# Patient Record
Sex: FEMALE | Race: BLACK OR AFRICAN AMERICAN | Employment: PART TIME | URBAN - METROPOLITAN AREA
[De-identification: names, ages, dates, MRNs, and addresses within clinical notes are randomized per-mention and may not be internally consistent; named-entity substitution may affect disease eponyms.]

---

## 2021-11-15 ENCOUNTER — HOSPITAL ENCOUNTER (INPATIENT)
Age: 21
LOS: 5 days | Discharge: HOME | DRG: 872 | End: 2021-11-20
Attending: INTERNAL MEDICINE | Admitting: INTERNAL MEDICINE
Payer: COMMERCIAL

## 2021-11-15 ENCOUNTER — APPOINTMENT (OUTPATIENT)
Dept: CT IMAGING | Age: 21
DRG: 872 | End: 2021-11-15
Payer: COMMERCIAL

## 2021-11-15 DIAGNOSIS — N15.1 ABSCESS OF LEFT KIDNEY: ICD-10-CM

## 2021-11-15 DIAGNOSIS — N10 ACUTE PYELONEPHRITIS: Primary | ICD-10-CM

## 2021-11-15 PROBLEM — A41.9 SEPSIS (HCC): Status: ACTIVE | Noted: 2021-11-15

## 2021-11-15 LAB
A/G RATIO: 0.8 (ref 1.1–2.2)
ALBUMIN SERPL-MCNC: 3.1 G/DL (ref 3.4–5)
ALP BLD-CCNC: 116 U/L (ref 40–129)
ALT SERPL-CCNC: 15 U/L (ref 10–40)
ANION GAP SERPL CALCULATED.3IONS-SCNC: 9 MMOL/L (ref 3–16)
AST SERPL-CCNC: 16 U/L (ref 15–37)
BACTERIA: ABNORMAL /HPF
BANDED NEUTROPHILS RELATIVE PERCENT: 3 % (ref 0–7)
BASOPHILS ABSOLUTE: 0 K/UL (ref 0–0.2)
BASOPHILS RELATIVE PERCENT: 0 %
BILIRUB SERPL-MCNC: 0.6 MG/DL (ref 0–1)
BILIRUBIN URINE: NEGATIVE
BLOOD, URINE: ABNORMAL
BUN BLDV-MCNC: 12 MG/DL (ref 7–20)
CALCIUM SERPL-MCNC: 8.8 MG/DL (ref 8.3–10.6)
CHLORIDE BLD-SCNC: 97 MMOL/L (ref 99–110)
CLARITY: ABNORMAL
CO2: 24 MMOL/L (ref 21–32)
COLOR: ABNORMAL
COMMENT UA: ABNORMAL
CREAT SERPL-MCNC: 0.9 MG/DL (ref 0.6–1.1)
EOSINOPHILS ABSOLUTE: 0 K/UL (ref 0–0.6)
EOSINOPHILS RELATIVE PERCENT: 0 %
EPITHELIAL CELLS, UA: 8 /HPF (ref 0–5)
GFR AFRICAN AMERICAN: >60
GFR NON-AFRICAN AMERICAN: >60
GLUCOSE BLD-MCNC: 120 MG/DL (ref 70–99)
GLUCOSE URINE: NEGATIVE MG/DL
HCG QUALITATIVE: NEGATIVE
HCT VFR BLD CALC: 37.3 % (ref 36–48)
HEMOGLOBIN: 12.2 G/DL (ref 12–16)
KETONES, URINE: ABNORMAL MG/DL
LACTIC ACID: 1.2 MMOL/L (ref 0.4–2)
LEUKOCYTE ESTERASE, URINE: ABNORMAL
LIPASE: 27 U/L (ref 13–60)
LYMPHOCYTES ABSOLUTE: 1 K/UL (ref 1–5.1)
LYMPHOCYTES RELATIVE PERCENT: 4 %
MCH RBC QN AUTO: 29.8 PG (ref 26–34)
MCHC RBC AUTO-ENTMCNC: 32.7 G/DL (ref 31–36)
MCV RBC AUTO: 91 FL (ref 80–100)
MICROSCOPIC EXAMINATION: YES
MONOCYTES ABSOLUTE: 1.7 K/UL (ref 0–1.3)
MONOCYTES RELATIVE PERCENT: 7 %
NEUTROPHILS ABSOLUTE: 21.2 K/UL (ref 1.7–7.7)
NEUTROPHILS RELATIVE PERCENT: 86 %
NITRITE, URINE: POSITIVE
PDW BLD-RTO: 13.2 % (ref 12.4–15.4)
PH UA: 6.5 (ref 5–8)
PLATELET # BLD: 309 K/UL (ref 135–450)
PMV BLD AUTO: 7.3 FL (ref 5–10.5)
POTASSIUM SERPL-SCNC: 3.7 MMOL/L (ref 3.5–5.1)
PROTEIN UA: 100 MG/DL
RBC # BLD: 4.1 M/UL (ref 4–5.2)
RBC # BLD: NORMAL 10*6/UL
RBC UA: 16 /HPF (ref 0–4)
SMUDGE CELLS: PRESENT
SODIUM BLD-SCNC: 130 MMOL/L (ref 136–145)
SPECIFIC GRAVITY UA: 1.02 (ref 1–1.03)
TOTAL PROTEIN: 7.2 G/DL (ref 6.4–8.2)
URINE REFLEX TO CULTURE: YES
URINE TYPE: ABNORMAL
UROBILINOGEN, URINE: 4 E.U./DL
WBC # BLD: 23.8 K/UL (ref 4–11)
WBC UA: 847 /HPF (ref 0–5)

## 2021-11-15 PROCEDURE — 96375 TX/PRO/DX INJ NEW DRUG ADDON: CPT

## 2021-11-15 PROCEDURE — 87186 SC STD MICRODIL/AGAR DIL: CPT

## 2021-11-15 PROCEDURE — 87088 URINE BACTERIA CULTURE: CPT

## 2021-11-15 PROCEDURE — 99283 EMERGENCY DEPT VISIT LOW MDM: CPT

## 2021-11-15 PROCEDURE — 96374 THER/PROPH/DIAG INJ IV PUSH: CPT

## 2021-11-15 PROCEDURE — 74177 CT ABD & PELVIS W/CONTRAST: CPT

## 2021-11-15 PROCEDURE — 36415 COLL VENOUS BLD VENIPUNCTURE: CPT

## 2021-11-15 PROCEDURE — 6360000002 HC RX W HCPCS: Performed by: INTERNAL MEDICINE

## 2021-11-15 PROCEDURE — 85025 COMPLETE CBC W/AUTO DIFF WBC: CPT

## 2021-11-15 PROCEDURE — 84703 CHORIONIC GONADOTROPIN ASSAY: CPT

## 2021-11-15 PROCEDURE — 80053 COMPREHEN METABOLIC PANEL: CPT

## 2021-11-15 PROCEDURE — 2580000003 HC RX 258: Performed by: PHYSICIAN ASSISTANT

## 2021-11-15 PROCEDURE — 83605 ASSAY OF LACTIC ACID: CPT

## 2021-11-15 PROCEDURE — 6370000000 HC RX 637 (ALT 250 FOR IP): Performed by: INTERNAL MEDICINE

## 2021-11-15 PROCEDURE — 6360000002 HC RX W HCPCS: Performed by: PHYSICIAN ASSISTANT

## 2021-11-15 PROCEDURE — 2060000000 HC ICU INTERMEDIATE R&B

## 2021-11-15 PROCEDURE — 81001 URINALYSIS AUTO W/SCOPE: CPT

## 2021-11-15 PROCEDURE — 87040 BLOOD CULTURE FOR BACTERIA: CPT

## 2021-11-15 PROCEDURE — 83690 ASSAY OF LIPASE: CPT

## 2021-11-15 PROCEDURE — 87086 URINE CULTURE/COLONY COUNT: CPT

## 2021-11-15 PROCEDURE — 2580000003 HC RX 258: Performed by: INTERNAL MEDICINE

## 2021-11-15 PROCEDURE — 6360000004 HC RX CONTRAST MEDICATION: Performed by: PHYSICIAN ASSISTANT

## 2021-11-15 PROCEDURE — 6370000000 HC RX 637 (ALT 250 FOR IP): Performed by: PHYSICIAN ASSISTANT

## 2021-11-15 RX ORDER — ACETAMINOPHEN 500 MG
500 TABLET ORAL ONCE
Status: COMPLETED | OUTPATIENT
Start: 2021-11-15 | End: 2021-11-15

## 2021-11-15 RX ORDER — ONDANSETRON 2 MG/ML
4 INJECTION INTRAMUSCULAR; INTRAVENOUS EVERY 6 HOURS PRN
Status: DISCONTINUED | OUTPATIENT
Start: 2021-11-15 | End: 2021-11-20 | Stop reason: HOSPADM

## 2021-11-15 RX ORDER — SODIUM CHLORIDE 0.9 % (FLUSH) 0.9 %
5-40 SYRINGE (ML) INJECTION PRN
Status: DISCONTINUED | OUTPATIENT
Start: 2021-11-15 | End: 2021-11-20 | Stop reason: HOSPADM

## 2021-11-15 RX ORDER — ACETAMINOPHEN 325 MG/1
650 TABLET ORAL EVERY 6 HOURS PRN
Status: DISCONTINUED | OUTPATIENT
Start: 2021-11-15 | End: 2021-11-20 | Stop reason: HOSPADM

## 2021-11-15 RX ORDER — MORPHINE SULFATE 2 MG/ML
2 INJECTION, SOLUTION INTRAMUSCULAR; INTRAVENOUS
Status: DISCONTINUED | OUTPATIENT
Start: 2021-11-15 | End: 2021-11-20 | Stop reason: HOSPADM

## 2021-11-15 RX ORDER — SODIUM CHLORIDE, SODIUM LACTATE, POTASSIUM CHLORIDE, CALCIUM CHLORIDE 600; 310; 30; 20 MG/100ML; MG/100ML; MG/100ML; MG/100ML
INJECTION, SOLUTION INTRAVENOUS CONTINUOUS
Status: DISCONTINUED | OUTPATIENT
Start: 2021-11-15 | End: 2021-11-20 | Stop reason: HOSPADM

## 2021-11-15 RX ORDER — 0.9 % SODIUM CHLORIDE 0.9 %
1000 INTRAVENOUS SOLUTION INTRAVENOUS ONCE
Status: COMPLETED | OUTPATIENT
Start: 2021-11-15 | End: 2021-11-15

## 2021-11-15 RX ORDER — ONDANSETRON 2 MG/ML
4 INJECTION INTRAMUSCULAR; INTRAVENOUS ONCE
Status: COMPLETED | OUTPATIENT
Start: 2021-11-15 | End: 2021-11-15

## 2021-11-15 RX ORDER — SODIUM CHLORIDE 9 MG/ML
25 INJECTION, SOLUTION INTRAVENOUS PRN
Status: DISCONTINUED | OUTPATIENT
Start: 2021-11-15 | End: 2021-11-20 | Stop reason: HOSPADM

## 2021-11-15 RX ORDER — FENTANYL CITRATE 50 UG/ML
25 INJECTION, SOLUTION INTRAMUSCULAR; INTRAVENOUS ONCE
Status: COMPLETED | OUTPATIENT
Start: 2021-11-15 | End: 2021-11-15

## 2021-11-15 RX ORDER — LAMOTRIGINE 25 MG/1
25 TABLET ORAL 2 TIMES DAILY
COMMUNITY

## 2021-11-15 RX ORDER — ONDANSETRON 4 MG/1
4 TABLET, ORALLY DISINTEGRATING ORAL EVERY 8 HOURS PRN
Status: DISCONTINUED | OUTPATIENT
Start: 2021-11-15 | End: 2021-11-20 | Stop reason: HOSPADM

## 2021-11-15 RX ORDER — ACETAMINOPHEN 650 MG/1
650 SUPPOSITORY RECTAL EVERY 6 HOURS PRN
Status: DISCONTINUED | OUTPATIENT
Start: 2021-11-15 | End: 2021-11-20 | Stop reason: HOSPADM

## 2021-11-15 RX ORDER — SODIUM CHLORIDE 0.9 % (FLUSH) 0.9 %
5-40 SYRINGE (ML) INJECTION EVERY 12 HOURS SCHEDULED
Status: DISCONTINUED | OUTPATIENT
Start: 2021-11-15 | End: 2021-11-20 | Stop reason: HOSPADM

## 2021-11-15 RX ORDER — POLYETHYLENE GLYCOL 3350 17 G/17G
17 POWDER, FOR SOLUTION ORAL DAILY PRN
Status: DISCONTINUED | OUTPATIENT
Start: 2021-11-15 | End: 2021-11-20 | Stop reason: HOSPADM

## 2021-11-15 RX ADMIN — SODIUM CHLORIDE, POTASSIUM CHLORIDE, SODIUM LACTATE AND CALCIUM CHLORIDE: 600; 310; 30; 20 INJECTION, SOLUTION INTRAVENOUS at 16:20

## 2021-11-15 RX ADMIN — SODIUM CHLORIDE, PRESERVATIVE FREE 10 ML: 5 INJECTION INTRAVENOUS at 22:27

## 2021-11-15 RX ADMIN — ACETAMINOPHEN 650 MG: 325 TABLET ORAL at 20:44

## 2021-11-15 RX ADMIN — SODIUM CHLORIDE 1000 ML: 9 INJECTION, SOLUTION INTRAVENOUS at 13:09

## 2021-11-15 RX ADMIN — SODIUM CHLORIDE 25 ML: 9 INJECTION, SOLUTION INTRAVENOUS at 20:39

## 2021-11-15 RX ADMIN — PIPERACILLIN AND TAZOBACTAM 3375 MG: 3; .375 INJECTION, POWDER, LYOPHILIZED, FOR SOLUTION INTRAVENOUS at 20:40

## 2021-11-15 RX ADMIN — ONDANSETRON 4 MG: 2 INJECTION INTRAMUSCULAR; INTRAVENOUS at 19:20

## 2021-11-15 RX ADMIN — PIPERACILLIN AND TAZOBACTAM 3375 MG: 3; .375 INJECTION, POWDER, LYOPHILIZED, FOR SOLUTION INTRAVENOUS at 14:41

## 2021-11-15 RX ADMIN — FENTANYL CITRATE 25 MCG: 50 INJECTION, SOLUTION INTRAMUSCULAR; INTRAVENOUS at 13:09

## 2021-11-15 RX ADMIN — MORPHINE SULFATE 2 MG: 2 INJECTION, SOLUTION INTRAMUSCULAR; INTRAVENOUS at 16:22

## 2021-11-15 RX ADMIN — MORPHINE SULFATE 2 MG: 2 INJECTION, SOLUTION INTRAMUSCULAR; INTRAVENOUS at 19:20

## 2021-11-15 RX ADMIN — ENOXAPARIN SODIUM 40 MG: 100 INJECTION SUBCUTANEOUS at 17:37

## 2021-11-15 RX ADMIN — ACETAMINOPHEN 500 MG: 500 TABLET ORAL at 11:00

## 2021-11-15 RX ADMIN — MORPHINE SULFATE 2 MG: 2 INJECTION, SOLUTION INTRAMUSCULAR; INTRAVENOUS at 22:27

## 2021-11-15 RX ADMIN — SODIUM CHLORIDE 1000 ML: 9 INJECTION, SOLUTION INTRAVENOUS at 11:46

## 2021-11-15 RX ADMIN — SODIUM CHLORIDE, PRESERVATIVE FREE 10 ML: 5 INJECTION INTRAVENOUS at 20:34

## 2021-11-15 RX ADMIN — IOPAMIDOL 75 ML: 755 INJECTION, SOLUTION INTRAVENOUS at 12:04

## 2021-11-15 RX ADMIN — ONDANSETRON 4 MG: 2 INJECTION INTRAMUSCULAR; INTRAVENOUS at 11:46

## 2021-11-15 ASSESSMENT — PAIN DESCRIPTION - ORIENTATION
ORIENTATION: LEFT
ORIENTATION: LOWER
ORIENTATION: LEFT

## 2021-11-15 ASSESSMENT — PAIN SCALES - GENERAL
PAINLEVEL_OUTOF10: 8
PAINLEVEL_OUTOF10: 6
PAINLEVEL_OUTOF10: 8
PAINLEVEL_OUTOF10: 4
PAINLEVEL_OUTOF10: 6
PAINLEVEL_OUTOF10: 8
PAINLEVEL_OUTOF10: 7

## 2021-11-15 ASSESSMENT — ENCOUNTER SYMPTOMS
BLOOD IN STOOL: 0
NAUSEA: 1
COUGH: 0
SHORTNESS OF BREATH: 0
ABDOMINAL PAIN: 1
COLOR CHANGE: 0
DIARRHEA: 0
VOMITING: 1
BACK PAIN: 1
ANAL BLEEDING: 0
CONSTIPATION: 1
STRIDOR: 0
WHEEZING: 0
RECTAL PAIN: 0
ABDOMINAL DISTENTION: 0

## 2021-11-15 ASSESSMENT — PAIN DESCRIPTION - PAIN TYPE
TYPE: ACUTE PAIN

## 2021-11-15 ASSESSMENT — PAIN DESCRIPTION - LOCATION
LOCATION: ABDOMEN;BACK

## 2021-11-15 NOTE — PROGRESS NOTES
Pt seen in  ED, admission completed. Pt is alert and oriented x 4. Pt lives at home with her parents, and is being admitted for sepsis. Plan of care updated, all questions answered.

## 2021-11-15 NOTE — ED NOTES
Pharmacy Medication History Note      List of current medications patient is taking is complete. Source of information: patient and fill hx     Changes made to medication list:  Medications flagged for removal (include reason, ex. noncompliance):  none    Medications removed (include reason, ex. therapy complete or physician discontinued):  none      Medications added/doses adjusted:  none    Other notes (ex. Recent course of antibiotics, Coumadin dosing):  Denies use of other OTC or herbal medications. Last dose times updated. Hemant Benz, EarlineD  Pharmacy Resident   [id number]    No current facility-administered medications on file prior to encounter.      Current Outpatient Medications on File Prior to Encounter   Medication Sig Dispense Refill    cariprazine hcl (VRAYLAR) 1.5 MG capsule Take 1.5 mg by mouth daily       lamoTRIgine (LAMICTAL) 25 MG tablet Take 25 mg by mouth 2 times daily

## 2021-11-15 NOTE — ED PROVIDER NOTES
[facility]        Pt Name: Citlalli Rondon  MRN: [id number]  [place] 2000  Date of evaluation: 11/15/2021  Provider: Jamilah Flores PA-C  PCP: No primary care provider on file. Note Started: 11:20 AM EST       PRASAD. I have evaluated this patient. My supervising physician was available for consultation. CHIEF COMPLAINT       Chief Complaint   Patient presents with    Abdominal Pain     Pt to ER from home for complaint of generalized abdominal pain, low back pain, N/V, and fever starting 2 days ago. Pt reports poor appetite, as well as constipation. She also reports frequency of urine and cloudy urine. +dizziness.  Emesis    Fever    Back Pain       HISTORY OF PRESENT ILLNESS   (Location, Timing/Onset, Context/Setting, Quality, Duration, Modifying Factors, Severity, Associated Signs and Symptoms)  Note limiting factors. Chief Complaint: Urinary frequency, urgency and cloudy urine for 3 days and generalized abdominal pain for 2 days    Citlalli Rondon is a 24 y.o. female who presents to the emergency department complaining of urinary frequency, urgency and cloudy urine for 3 days. She has had intermittent fever and chills. She reports generalized abdominal pain for 2 days primarily on the left side with radiation to the low back. She associates this with nausea, vomiting, constipation, lightheadedness and poor appetite. She is lying in bed comfortably despite rating pain to be 8 out of 10 on pain scale. She took Aleve earlier today. Has not taken any Tylenol. Nursing Notes were all reviewed and agreed with or any disagreements were addressed in the HPI. REVIEW OF SYSTEMS    (2-9 systems for level 4, 10 or more for level 5)     Review of Systems   Constitutional: Positive for appetite change, chills and fever. HENT: Negative. Eyes: Negative for visual disturbance.    Respiratory: Negative for cough, shortness of breath, wheezing and stridor. Cardiovascular: Negative for chest pain, palpitations and leg swelling. Gastrointestinal: Positive for abdominal pain, constipation, nausea and vomiting. Negative for abdominal distention, anal bleeding, blood in stool, diarrhea and rectal pain. Endocrine: Negative. Genitourinary: Positive for frequency and urgency. Negative for difficulty urinating, dysuria, flank pain, hematuria, pelvic pain, vaginal bleeding, vaginal discharge and vaginal pain. Musculoskeletal: Positive for back pain. Negative for neck pain and neck stiffness. Skin: Negative for color change, pallor, rash and wound. Neurological: Positive for light-headedness. Negative for syncope, facial asymmetry, numbness and headaches. Psychiatric/Behavioral: Negative for confusion. All other systems reviewed and are negative. Positives and Pertinent negatives as per HPI. Except as noted above in the ROS, all other systems were reviewed and negative. PAST MEDICAL HISTORY   History reviewed. No pertinent past medical history. SURGICAL HISTORY   History reviewed. No pertinent surgical history. CURRENTMEDICATIONS       Previous Medications    CARIPRAZINE HCL (VRAYLAR PO)    Take by mouth daily    LAMOTRIGINE (LAMICTAL PO)    Take by mouth daily         ALLERGIES     Patient has no known allergies. FAMILYHISTORY     History reviewed. No pertinent family history. SOCIAL HISTORY       Social History     Tobacco Use    Smoking status: Never Smoker    Smokeless tobacco: Never Used   Substance Use Topics    Alcohol use: Never    Drug use: Never       SCREENINGS             PHYSICAL EXAM    (up to 7 for level 4, 8 or more for level 5)     ED Triage Vitals [11/15/21 1045]   BP Temp Temp Source Pulse Resp SpO2 Height Weight   98/63 100.9 °F (38.3 °C) Oral 110 14 100 % 5' 8\" (1.727 m) 131 lb (59.4 kg)       Physical Exam  Vitals and nursing note reviewed. Constitutional:       Appearance: She is well-developed. She is not toxic-appearing or diaphoretic. HENT:      Head: Normocephalic and atraumatic. Right Ear: External ear normal.      Left Ear: External ear normal.      Nose: Nose normal.      Mouth/Throat:      Mouth: Mucous membranes are moist.      Pharynx: Oropharynx is clear. Eyes:      General: No scleral icterus. Right eye: No discharge. Left eye: No discharge. Extraocular Movements: Extraocular movements intact. Conjunctiva/sclera: Conjunctivae normal.      Pupils: Pupils are equal, round, and reactive to light. Cardiovascular:      Rate and Rhythm: Tachycardia present. Pulmonary:      Effort: Pulmonary effort is normal.      Breath sounds: Normal breath sounds. Abdominal:      General: Bowel sounds are normal. There is no abdominal bruit. Palpations: Abdomen is soft. There is no pulsatile mass. Tenderness: There is generalized abdominal tenderness. There is no right CVA tenderness, left CVA tenderness, guarding or rebound. Negative signs include Chaney's sign, Rovsing's sign, McBurney's sign, psoas sign and obturator sign. Musculoskeletal:         General: Normal range of motion. Cervical back: Normal range of motion. Skin:     General: Skin is warm and dry. Capillary Refill: Capillary refill takes less than 2 seconds. Coloration: Skin is not jaundiced or pale. Findings: No bruising, erythema, lesion or rash. Neurological:      Mental Status: She is alert and oriented to person, place, and time. Mental status is at baseline.    Psychiatric:         Mood and Affect: Mood normal.         Behavior: Behavior normal.         DIAGNOSTIC RESULTS   LABS:    Labs Reviewed   CBC WITH AUTO DIFFERENTIAL - Abnormal; Notable for the following components:       Result Value    WBC 23.8 (*)     Neutrophils Absolute 21.2 (*)     Monocytes Absolute 1.7 (*)     Smudge Cells Present (*)     All other components within normal limits    Narrative:     Performed at:  [facility] [address] [employer]  [place], [facility] [employer]   Phone [phone]   COMPREHENSIVE METABOLIC PANEL - Abnormal; Notable for the following components:    Sodium 130 (*)     Chloride 97 (*)     Glucose 120 (*)     Albumin 3.1 (*)     Albumin/Globulin Ratio 0.8 (*)     All other components within normal limits    Narrative:     Performed at:  [facility] [address] [employer]  [place], [facility] [employer]   Phone [phone]   URINE RT REFLEX TO CULTURE - Abnormal; Notable for the following components:    Clarity, UA TURBID (*)     Ketones, Urine TRACE (*)     Blood, Urine MODERATE (*)     Protein,  (*)     Urobilinogen, Urine 4.0 (*)     Nitrite, Urine POSITIVE (*)     Leukocyte Esterase, Urine LARGE (*)     All other components within normal limits    Narrative:     Performed at:  [facility] [address]  [place], [address] [employer]   Phone [phone]   MICROSCOPIC URINALYSIS - Abnormal; Notable for the following components:    Bacteria, UA 4+ (*)     WBC,  (*)     RBC, UA 16 (*)     Epithelial Cells, UA 8 (*)     All other components within normal limits    Narrative:     Performed at:  [facility] [address]  [place], [facility] [employer]   Phone [phone]   CULTURE, URINE   CULTURE, BLOOD 2   CULTURE, BLOOD 1   LACTIC ACID, PLASMA    Narrative:     Performed at:  [facility] [address]  [place], [facility] [employer]   Phone [phone]   LIPASE    Narrative:     Performed at:  [facility] [address],  [place], [facility] [employer]   Phone [phone]   HCG, SERUM, QUALITATIVE    Narrative:     Performed at:  [facility] [address] [employer]  [place], [facility] [employer]   Phone [phone]   LACTIC ACID, PLASMA       When ordered only abnormal lab results are displayed. All other labs were within normal range or not returned as of this dictation. EKG: When ordered, EKG's are interpreted by the Emergency Department Physician in the absence of a cardiologist.  Please see their note for interpretation of EKG. RADIOLOGY:   Non-plain film images such as CT, Ultrasound and MRI are read by the radiologist. Plain radiographic images are visualized and preliminarily interpreted by the ED Provider with the below findings:        Interpretation per the Radiologist below, if available at the time of this note:    CT ABDOMEN PELVIS W IV CONTRAST Additional Contrast? None   Preliminary Result   1. Findings are most consistent with asymmetric left pyelonephritis with a   possible developing cortical abscess within the left kidney upper pole. 2. Findings suggestive of cystitis. PROCEDURES   Unless otherwise noted below, none     Procedures    CRITICAL CARE TIME   N/A    CONSULTS:  IP CONSULT TO UROLOGY  IP CONSULT TO HOSPITALIST  I discussed the case with Dr. Ryanne Duncan, urologist, at [phone]. Agrees with plan for IV cefepime. Would like for hospitalist to admit.     EMERGENCY DEPARTMENT COURSE and DIFFERENTIAL DIAGNOSIS/MDM:   Vitals:    Vitals:    11/15/21 1045   BP: 98/63   Pulse: 110   Resp: 14   Temp: 100.9 °F (38.3 °C)   TempSrc: Oral   SpO2: 100%   Weight: 131 lb (59.4 kg)   Height: 5' 8\" (1.727 m)       Patient was given the following medications:  Medications   0.9 % sodium chloride bolus (has no administration in time range)   cefepime (MAXIPIME) 2000 mg IVPB minibag (has no administration in time range)   fentaNYL (SUBLIMAZE) injection 25 mcg (has no administration in time range)   0.9 % sodium chloride bolus (1,000 mLs IntraVENous New Bag 11/15/21 1146)   ondansetron (ZOFRAN) injection 4 mg (4 mg IntraVENous Given 11/15/21 1146)   acetaminophen (TYLENOL) tablet 500 mg (500 mg Oral Given 11/15/21 1100)   iopamidol (ISOVUE-370) 76 % injection 75 mL (75 mLs IntraVENous Given 11/15/21 9239)         This patient presents to the emergency department complaining of urinary symptoms and abdominal pain with radiation to low back. Urinalysis does reveals evidence of infection and CT scan shows findings concerning for acute pyelonephritis and developing abscess. Therefore I did consult with urology. Agrees with iv fluids and abx. hospitalist to admit    FINAL IMPRESSION      1. Acute pyelonephritis    2. Abscess of left kidney          DISPOSITION/PLAN   DISPOSITION Decision To Admit 11/15/2021 12:34:46 PM      PATIENT REFERRED TO:  No follow-up provider specified.     DISCHARGE MEDICATIONS:  New Prescriptions    No medications on file       DISCONTINUED MEDICATIONS:  Discontinued Medications    No medications on file              (Please note that portions of this note were completed with a voice recognition program.  Efforts were made to edit the dictations but occasionally words are mis-transcribed.)    Nupur Rivas PA-C (electronically signed)           Nupur Rivas PA-C  11/15/21 4016

## 2021-11-15 NOTE — LETTER
[facility] [address] [place] [address]  Tanika [address]  Phone: [phone]             November 20, 2021    Patient: Merline Salvo   YOB: 2000   Date of Visit: 11/15/2021       To Whom It May Concern:    Dino Crawford was seen and treated in our facility  beginning 11/15/2021 until 11/20/2021. She may return to school on 11/23/2021. Sincerely,       Selvin Montero.  COLTON Alvarez         Signature:__________________________________

## 2021-11-15 NOTE — H&P
HOSPITALISTS HISTORY AND PHYSICAL    11/15/2021 1:40 PM    Patient Information:  Sabrina Rivera is a 24 y.o. female [id number]  PCP:  No primary care provider on file. (Tel: None )    Chief complaint:    Chief Complaint   Patient presents with    Abdominal Pain     Pt to ER from home for complaint of generalized abdominal pain, low back pain, N/V, and fever starting 2 days ago. Pt reports poor appetite, as well as constipation. She also reports frequency of urine and cloudy urine. +dizziness.  Emesis    Fever    Back Pain     History of Present Illness:  Axel Cutler is a 24 y.o. female on Friday started have burning when she urinated along with increased urinary frequency C. This progressed to left flank pain 10 out of 10 sharp in nature along with nausea and multiple episodes of vomiting and poor p.o. intake. Patient did have a fever of 102. *       REVIEW OF SYSTEMS:   Constitutional: see above  ENT: Negative for rhinorrhea, epistaxis, hoarseness, sore throat. Respiratory: Negative for shortness of breath,wheezing  Cardiovascular: Negative for chest pain, palpitations   Gastrointestinalsee above  Genitourinary: Negative for polyuria, dysuria   Hematologic/Lymphatic: Negative for bleeding tendency, easy bruising  Musculoskeletal: Negative for myalgias and arthralgias  Neurologic: Negative for confusion,dysarthria. Skin: Negative for itching,rash  Psychiatric: Negative for depression,anxiety, agitation. Endocrine: Negative for polydipsia,polyuria,heat /cold intolerance. Past Medical History:  deniies  Past Surgical History:  denies  Medications:  No current facility-administered medications on file prior to encounter.      Current Outpatient Medications on File Prior to Encounter   Medication Sig Dispense Refill    cariprazine hcl (VRAYLAR) 1.5 MG capsule Take 1.5 mg by mouth daily       lamoTRIgine (LAMICTAL) 25 MG tablet Take 25 mg by mouth 2 times daily          Allergies:  No Known Allergies     Social History:  Patient Lives at home   reports that she has never smoked. She has never used smokeless tobacco. She reports that she does not drink alcohol and does not use drugs. Family History:  htn  Physical Exam:  /69   Pulse 90   Temp 98.6 °F (37 °C) (Oral)   Resp 16   Ht 5' 8\" (1.727 m)   Wt 131 lb (59.4 kg)   SpO2 100%   BMI 19.92 kg/m²     General appearance:  Appears uncomfortable. AAOx3  HEENT: atraumatic, Pupils equal, muscous membranes moist, no masses appreciated  Cardiovascular: tachycardia no murmurs appreciated cap refill < 2sec  Respiratory: CTAB no wheezing  Gastrointestinal: Abdomen soft, non-tender, BS+ CVA tenderness to palpation  EXT: no edema  Neurology: no gross focal deficts  Psychiatry: Appropriate affect. Not agitated  Skin: Warm, dry, no rashes appreciated    Labs:  CBC:   Lab Results   Component Value Date    WBC 23.8 11/15/2021    RBC 4.10 11/15/2021    HGB 12.2 11/15/2021    HCT 37.3 11/15/2021    MCV 91.0 11/15/2021    MCH 29.8 11/15/2021    MCHC 32.7 11/15/2021    RDW 13.2 11/15/2021     11/15/2021    MPV 7.3 11/15/2021     BMP:    Lab Results   Component Value Date     11/15/2021    K 3.7 11/15/2021    CL 97 11/15/2021    CO2 24 11/15/2021    BUN 12 11/15/2021    CREATININE 0.9 11/15/2021    CALCIUM 8.8 11/15/2021    GFRAA >60 11/15/2021    LABGLOM >60 11/15/2021    GLUCOSE 120 11/15/2021     CT ABDOMEN PELVIS W IV CONTRAST Additional Contrast? None   Preliminary Result   1. Findings are most consistent with asymmetric left pyelonephritis with a   possible developing cortical abscess within the left kidney upper pole. 2. Findings suggestive of cystitis. Recent imaging reviewed    Problem List  Active Problems:    Sepsis ([address]  Resolved Problems:    * No resolved hospital problems.  *        Assessment/Plan: Sepsis secondary to pyelonephritis with possible cortical abscess  - iv zosyn  - bolus 2L  Iv fluids, then 100cc hr  - fu cultures  - urology consult  - iv morphine prn for pain control    Hyponatremia: ivf repeat labs now     DVT prophylaxis lovenox  Code status full code    I spent 32 minutes providing critical care services to this patient        Admit as inpatient I anticipate hospitalization spanning more than two midnights for investigation and treatment of the above medically necessary diagnoses. Please note that some part of this chart was generated using Dragon dictation software. Although every effort was made to ensure the accuracy of this automated transcription, some errors in transcription may have occurred inadvertently. If you may need any clarification, please do not hesitate to contact me through [facility].        Justyn Granados MD    11/15/2021 1:40 PM

## 2021-11-16 LAB
ANION GAP SERPL CALCULATED.3IONS-SCNC: 12 MMOL/L (ref 3–16)
ANISOCYTOSIS: ABNORMAL
BANDED NEUTROPHILS RELATIVE PERCENT: 9 % (ref 0–7)
BASOPHILS ABSOLUTE: 0 K/UL (ref 0–0.2)
BASOPHILS RELATIVE PERCENT: 0 %
BUN BLDV-MCNC: 7 MG/DL (ref 7–20)
CALCIUM SERPL-MCNC: 8 MG/DL (ref 8.3–10.6)
CHLORIDE BLD-SCNC: 104 MMOL/L (ref 99–110)
CO2: 22 MMOL/L (ref 21–32)
CREAT SERPL-MCNC: 0.8 MG/DL (ref 0.6–1.1)
EOSINOPHILS ABSOLUTE: 0 K/UL (ref 0–0.6)
EOSINOPHILS RELATIVE PERCENT: 0 %
GFR AFRICAN AMERICAN: >60
GFR NON-AFRICAN AMERICAN: >60
GLUCOSE BLD-MCNC: 121 MG/DL (ref 70–99)
HCT VFR BLD CALC: 32.5 % (ref 36–48)
HEMOGLOBIN: 10.8 G/DL (ref 12–16)
HYPOCHROMIA: ABNORMAL
LYMPHOCYTES ABSOLUTE: 2.2 K/UL (ref 1–5.1)
LYMPHOCYTES RELATIVE PERCENT: 9 %
MCH RBC QN AUTO: 30.1 PG (ref 26–34)
MCHC RBC AUTO-ENTMCNC: 33.4 G/DL (ref 31–36)
MCV RBC AUTO: 90.3 FL (ref 80–100)
MONOCYTES ABSOLUTE: 1.5 K/UL (ref 0–1.3)
MONOCYTES RELATIVE PERCENT: 6 %
NEUTROPHILS ABSOLUTE: 20.6 K/UL (ref 1.7–7.7)
NEUTROPHILS RELATIVE PERCENT: 76 %
OVALOCYTES: ABNORMAL
PDW BLD-RTO: 13.2 % (ref 12.4–15.4)
PLATELET # BLD: 334 K/UL (ref 135–450)
PLATELET SLIDE REVIEW: ADEQUATE
PMV BLD AUTO: 7 FL (ref 5–10.5)
POLYCHROMASIA: ABNORMAL
POTASSIUM REFLEX MAGNESIUM: 3.8 MMOL/L (ref 3.5–5.1)
RBC # BLD: 3.6 M/UL (ref 4–5.2)
SLIDE REVIEW: ABNORMAL
SODIUM BLD-SCNC: 138 MMOL/L (ref 136–145)
WBC # BLD: 24.2 K/UL (ref 4–11)

## 2021-11-16 PROCEDURE — 2580000003 HC RX 258: Performed by: INTERNAL MEDICINE

## 2021-11-16 PROCEDURE — 85025 COMPLETE CBC W/AUTO DIFF WBC: CPT

## 2021-11-16 PROCEDURE — 6370000000 HC RX 637 (ALT 250 FOR IP): Performed by: INTERNAL MEDICINE

## 2021-11-16 PROCEDURE — 6370000000 HC RX 637 (ALT 250 FOR IP): Performed by: PHYSICIAN ASSISTANT

## 2021-11-16 PROCEDURE — 80048 BASIC METABOLIC PNL TOTAL CA: CPT

## 2021-11-16 PROCEDURE — 6360000002 HC RX W HCPCS: Performed by: INTERNAL MEDICINE

## 2021-11-16 PROCEDURE — 2060000000 HC ICU INTERMEDIATE R&B

## 2021-11-16 PROCEDURE — 36415 COLL VENOUS BLD VENIPUNCTURE: CPT

## 2021-11-16 RX ORDER — PROMETHAZINE HYDROCHLORIDE 25 MG/ML
12.5 INJECTION, SOLUTION INTRAMUSCULAR; INTRAVENOUS EVERY 6 HOURS PRN
Status: DISCONTINUED | OUTPATIENT
Start: 2021-11-16 | End: 2021-11-20 | Stop reason: HOSPADM

## 2021-11-16 RX ORDER — PROMETHAZINE HYDROCHLORIDE 25 MG/ML
12.5 INJECTION, SOLUTION INTRAMUSCULAR; INTRAVENOUS EVERY 6 HOURS PRN
Status: DISCONTINUED | OUTPATIENT
Start: 2021-11-16 | End: 2021-11-16

## 2021-11-16 RX ORDER — LAMOTRIGINE 25 MG/1
25 TABLET ORAL 2 TIMES DAILY
Status: DISCONTINUED | OUTPATIENT
Start: 2021-11-16 | End: 2021-11-20 | Stop reason: HOSPADM

## 2021-11-16 RX ADMIN — PIPERACILLIN AND TAZOBACTAM 3375 MG: 3; .375 INJECTION, POWDER, LYOPHILIZED, FOR SOLUTION INTRAVENOUS at 06:11

## 2021-11-16 RX ADMIN — SODIUM CHLORIDE, POTASSIUM CHLORIDE, SODIUM LACTATE AND CALCIUM CHLORIDE: 600; 310; 30; 20 INJECTION, SOLUTION INTRAVENOUS at 02:39

## 2021-11-16 RX ADMIN — ACETAMINOPHEN 650 MG: 325 TABLET ORAL at 21:29

## 2021-11-16 RX ADMIN — ACETAMINOPHEN 650 MG: 325 TABLET ORAL at 14:45

## 2021-11-16 RX ADMIN — MORPHINE SULFATE 2 MG: 2 INJECTION, SOLUTION INTRAMUSCULAR; INTRAVENOUS at 07:21

## 2021-11-16 RX ADMIN — SODIUM CHLORIDE, PRESERVATIVE FREE 10 ML: 5 INJECTION INTRAVENOUS at 20:06

## 2021-11-16 RX ADMIN — SODIUM CHLORIDE, POTASSIUM CHLORIDE, SODIUM LACTATE AND CALCIUM CHLORIDE: 600; 310; 30; 20 INJECTION, SOLUTION INTRAVENOUS at 12:31

## 2021-11-16 RX ADMIN — SODIUM CHLORIDE 25 ML: 9 INJECTION, SOLUTION INTRAVENOUS at 19:48

## 2021-11-16 RX ADMIN — PIPERACILLIN AND TAZOBACTAM 3375 MG: 3; .375 INJECTION, POWDER, LYOPHILIZED, FOR SOLUTION INTRAVENOUS at 19:49

## 2021-11-16 RX ADMIN — MORPHINE SULFATE 2 MG: 2 INJECTION, SOLUTION INTRAMUSCULAR; INTRAVENOUS at 23:54

## 2021-11-16 RX ADMIN — SODIUM CHLORIDE, PRESERVATIVE FREE 10 ML: 5 INJECTION INTRAVENOUS at 21:42

## 2021-11-16 RX ADMIN — MORPHINE SULFATE 2 MG: 2 INJECTION, SOLUTION INTRAMUSCULAR; INTRAVENOUS at 12:26

## 2021-11-16 RX ADMIN — PROMETHAZINE HYDROCHLORIDE 12.5 MG: 25 INJECTION INTRAMUSCULAR; INTRAVENOUS at 14:41

## 2021-11-16 RX ADMIN — ENOXAPARIN SODIUM 40 MG: 100 INJECTION SUBCUTANEOUS at 09:02

## 2021-11-16 RX ADMIN — PROMETHAZINE HYDROCHLORIDE 12.5 MG: 25 INJECTION INTRAMUSCULAR; INTRAVENOUS at 21:42

## 2021-11-16 RX ADMIN — MORPHINE SULFATE 2 MG: 2 INJECTION, SOLUTION INTRAMUSCULAR; INTRAVENOUS at 01:21

## 2021-11-16 RX ADMIN — SODIUM CHLORIDE, PRESERVATIVE FREE 10 ML: 5 INJECTION INTRAVENOUS at 04:36

## 2021-11-16 RX ADMIN — ACETAMINOPHEN 650 MG: 325 TABLET ORAL at 04:34

## 2021-11-16 RX ADMIN — SODIUM CHLORIDE, PRESERVATIVE FREE 10 ML: 5 INJECTION INTRAVENOUS at 01:21

## 2021-11-16 RX ADMIN — MORPHINE SULFATE 2 MG: 2 INJECTION, SOLUTION INTRAMUSCULAR; INTRAVENOUS at 20:06

## 2021-11-16 RX ADMIN — MORPHINE SULFATE 2 MG: 2 INJECTION, SOLUTION INTRAMUSCULAR; INTRAVENOUS at 04:34

## 2021-11-16 RX ADMIN — MORPHINE SULFATE 2 MG: 2 INJECTION, SOLUTION INTRAMUSCULAR; INTRAVENOUS at 16:55

## 2021-11-16 RX ADMIN — ONDANSETRON 4 MG: 2 INJECTION INTRAMUSCULAR; INTRAVENOUS at 12:14

## 2021-11-16 RX ADMIN — LAMOTRIGINE 25 MG: 25 TABLET ORAL at 21:29

## 2021-11-16 RX ADMIN — SODIUM CHLORIDE, PRESERVATIVE FREE 10 ML: 5 INJECTION INTRAVENOUS at 07:22

## 2021-11-16 RX ADMIN — SODIUM CHLORIDE 25 ML: 9 INJECTION, SOLUTION INTRAVENOUS at 06:10

## 2021-11-16 RX ADMIN — ACETAMINOPHEN 650 MG: 325 TABLET ORAL at 09:06

## 2021-11-16 ASSESSMENT — PAIN DESCRIPTION - LOCATION
LOCATION: ABDOMEN;BACK
LOCATION: ABDOMEN
LOCATION: GENERALIZED
LOCATION: ABDOMEN
LOCATION: ABDOMEN;BACK
LOCATION: ABDOMEN

## 2021-11-16 ASSESSMENT — PAIN DESCRIPTION - ONSET
ONSET: ON-GOING

## 2021-11-16 ASSESSMENT — PAIN SCALES - GENERAL
PAINLEVEL_OUTOF10: 6
PAINLEVEL_OUTOF10: 4
PAINLEVEL_OUTOF10: 6
PAINLEVEL_OUTOF10: 4
PAINLEVEL_OUTOF10: 6
PAINLEVEL_OUTOF10: 3
PAINLEVEL_OUTOF10: 4
PAINLEVEL_OUTOF10: 8
PAINLEVEL_OUTOF10: 4
PAINLEVEL_OUTOF10: 4
PAINLEVEL_OUTOF10: 8
PAINLEVEL_OUTOF10: 5
PAINLEVEL_OUTOF10: 6
PAINLEVEL_OUTOF10: 9
PAINLEVEL_OUTOF10: 3
PAINLEVEL_OUTOF10: 2
PAINLEVEL_OUTOF10: 5

## 2021-11-16 ASSESSMENT — PAIN DESCRIPTION - FREQUENCY
FREQUENCY: CONTINUOUS

## 2021-11-16 ASSESSMENT — PAIN DESCRIPTION - ORIENTATION
ORIENTATION: LEFT
ORIENTATION: LEFT

## 2021-11-16 ASSESSMENT — PAIN DESCRIPTION - DESCRIPTORS
DESCRIPTORS: CONSTANT

## 2021-11-16 ASSESSMENT — PAIN DESCRIPTION - PAIN TYPE
TYPE: ACUTE PAIN

## 2021-11-16 ASSESSMENT — PAIN DESCRIPTION - DIRECTION: RADIATING_TOWARDS: BACK

## 2021-11-16 NOTE — PROGRESS NOTES
[facility] PROGRESS NOTE    11/16/2021 11:14 AM        Name: Manny Carmona . Admitted: 11/15/2021  Primary Care Provider: No primary care provider on file. (Tel: None)        Subjective:    Lying in bed still having left flank pain although improved nausea no chest pain shortness of breath vomiting or diarrhea    Reviewed interval ancillary notes    Current Medications  piperacillin-tazobactam (ZOSYN) 3,375 mg in dextrose 5 % 50 mL IVPB extended infusion (mini-bag), Q8H  morphine (PF) injection 2 mg, Q3H PRN  lactated ringers infusion, Continuous  sodium chloride flush 0.9 % injection 5-40 mL, 2 times per day  sodium chloride flush 0.9 % injection 5-40 mL, PRN  0.9 % sodium chloride infusion, PRN  enoxaparin (LOVENOX) injection 40 mg, Daily  ondansetron (ZOFRAN-ODT) disintegrating tablet 4 mg, Q8H PRN   Or  ondansetron (ZOFRAN) injection 4 mg, Q6H PRN  polyethylene glycol (GLYCOLAX) packet 17 g, Daily PRN  acetaminophen (TYLENOL) tablet 650 mg, Q6H PRN   Or  acetaminophen (TYLENOL) suppository 650 mg, Q6H PRN  influenza quadrivalent split vaccine (FLUZONE;FLUARIX;FLULAVAL;AFLURIA) injection 0.5 mL, Prior to discharge        Objective:  BP 98/61   Pulse 107   Temp 98.5 °F (36.9 °C) (Oral)   Resp 18   Ht 5' 8\" (1.727 m)   Wt 131 lb (59.4 kg)   LMP 10/26/2021 (Exact Date)   SpO2 98%   BMI 19.92 kg/m²     Intake/Output Summary (Last 24 hours) at 11/16/2021 1114  Last data filed at 11/16/2021 0615  Gross per 24 hour   Intake 3468.62 ml   Output --   Net 3468.62 ml      Wt Readings from Last 3 Encounters:   11/15/21 131 lb (59.4 kg)       General appearance:  Appears uncomfortable.  AAOx3  HEENT: atraumatic, Pupils equal, muscous membranes moist, no masses appreciated  Cardiovascular: tachycardia no murmurs appreciated cap refill < 2sec  Respiratory: CTAB no wheezing  Gastrointestinal: Abdomen soft, non-tender, BS+ CVA tenderness to palpation  EXT: no edema  Neurology: no gross focal deficts  Psychiatry: Appropriate affect. Not agitated  Skin: Warm, dry, no rashes appreciated       Labs and Tests:  CBC:   Recent Labs     11/15/21  1113 11/16/21  0515   WBC 23.8* 24.2*   HGB 12.2 10.8*    334     BMP:    Recent Labs     11/15/21  1113 11/16/21  0515   * 138   K 3.7 3.8   CL 97* 104   CO2 24 22   BUN 12 7   CREATININE 0.9 0.8   GLUCOSE 120* 121*     Hepatic:   Recent Labs     11/15/21  1113   AST 16   ALT 15   BILITOT 0.6   ALKPHOS 116     CT ABDOMEN PELVIS W IV CONTRAST Additional Contrast? None   Preliminary Result   1. Findings are most consistent with asymmetric left pyelonephritis with a   possible developing cortical abscess within the left kidney upper pole. 2. Findings suggestive of cystitis. Recent imaging reviewed    Problem List  Active Problems:    Sepsis ([address]  Resolved Problems:    * No resolved hospital problems.  *     Assessment/Plan:   Sepsis secondary to pyelonephritis with possible cortical abscess  - iv zosyn  - continue ivf  - fu cultures  - urology on board  - iv morphine prn for pain control     Hyponatremia:improved  DVT prophylaxis lovenox  Code status full code    Elizabeth Han MD   11/16/2021 11:14 AM

## 2021-11-17 LAB
ANION GAP SERPL CALCULATED.3IONS-SCNC: 11 MMOL/L (ref 3–16)
BANDED NEUTROPHILS RELATIVE PERCENT: 1 % (ref 0–7)
BASOPHILS ABSOLUTE: 0 K/UL (ref 0–0.2)
BASOPHILS RELATIVE PERCENT: 0 %
BUN BLDV-MCNC: 8 MG/DL (ref 7–20)
CALCIUM SERPL-MCNC: 7.9 MG/DL (ref 8.3–10.6)
CHLORIDE BLD-SCNC: 101 MMOL/L (ref 99–110)
CO2: 22 MMOL/L (ref 21–32)
CREAT SERPL-MCNC: 0.7 MG/DL (ref 0.6–1.1)
EOSINOPHILS ABSOLUTE: 0 K/UL (ref 0–0.6)
EOSINOPHILS RELATIVE PERCENT: 0 %
GFR AFRICAN AMERICAN: >60
GFR NON-AFRICAN AMERICAN: >60
GLUCOSE BLD-MCNC: 103 MG/DL (ref 70–99)
HCT VFR BLD CALC: 31.1 % (ref 36–48)
HEMOGLOBIN: 10.5 G/DL (ref 12–16)
LYMPHOCYTES ABSOLUTE: 0.9 K/UL (ref 1–5.1)
LYMPHOCYTES RELATIVE PERCENT: 4 %
MAGNESIUM: 1.8 MG/DL (ref 1.8–2.4)
MCH RBC QN AUTO: 30.2 PG (ref 26–34)
MCHC RBC AUTO-ENTMCNC: 33.7 G/DL (ref 31–36)
MCV RBC AUTO: 89.5 FL (ref 80–100)
MONOCYTES ABSOLUTE: 1.9 K/UL (ref 0–1.3)
MONOCYTES RELATIVE PERCENT: 8 %
NEUTROPHILS ABSOLUTE: 20.8 K/UL (ref 1.7–7.7)
NEUTROPHILS RELATIVE PERCENT: 87 %
ORGANISM: ABNORMAL
PDW BLD-RTO: 13.4 % (ref 12.4–15.4)
PLATELET # BLD: 351 K/UL (ref 135–450)
PLATELET SLIDE REVIEW: ADEQUATE
PMV BLD AUTO: 7 FL (ref 5–10.5)
POTASSIUM REFLEX MAGNESIUM: 3.5 MMOL/L (ref 3.5–5.1)
RBC # BLD: 3.48 M/UL (ref 4–5.2)
RBC # BLD: NORMAL 10*6/UL
SLIDE REVIEW: ABNORMAL
SODIUM BLD-SCNC: 134 MMOL/L (ref 136–145)
URINE CULTURE, ROUTINE: ABNORMAL
WBC # BLD: 23.6 K/UL (ref 4–11)

## 2021-11-17 PROCEDURE — 6360000002 HC RX W HCPCS: Performed by: INTERNAL MEDICINE

## 2021-11-17 PROCEDURE — 80048 BASIC METABOLIC PNL TOTAL CA: CPT

## 2021-11-17 PROCEDURE — 83735 ASSAY OF MAGNESIUM: CPT

## 2021-11-17 PROCEDURE — 6370000000 HC RX 637 (ALT 250 FOR IP): Performed by: INTERNAL MEDICINE

## 2021-11-17 PROCEDURE — 6370000000 HC RX 637 (ALT 250 FOR IP): Performed by: PHYSICIAN ASSISTANT

## 2021-11-17 PROCEDURE — 85025 COMPLETE CBC W/AUTO DIFF WBC: CPT

## 2021-11-17 PROCEDURE — 2060000000 HC ICU INTERMEDIATE R&B

## 2021-11-17 PROCEDURE — 2580000003 HC RX 258: Performed by: INTERNAL MEDICINE

## 2021-11-17 PROCEDURE — 36415 COLL VENOUS BLD VENIPUNCTURE: CPT

## 2021-11-17 RX ORDER — SCOLOPAMINE TRANSDERMAL SYSTEM 1 MG/1
1 PATCH, EXTENDED RELEASE TRANSDERMAL
Status: DISCONTINUED | OUTPATIENT
Start: 2021-11-17 | End: 2021-11-20 | Stop reason: HOSPADM

## 2021-11-17 RX ADMIN — SODIUM CHLORIDE, POTASSIUM CHLORIDE, SODIUM LACTATE AND CALCIUM CHLORIDE: 600; 310; 30; 20 INJECTION, SOLUTION INTRAVENOUS at 03:30

## 2021-11-17 RX ADMIN — ONDANSETRON 4 MG: 2 INJECTION INTRAMUSCULAR; INTRAVENOUS at 03:30

## 2021-11-17 RX ADMIN — ACETAMINOPHEN 650 MG: 325 TABLET ORAL at 03:30

## 2021-11-17 RX ADMIN — PROMETHAZINE HYDROCHLORIDE 12.5 MG: 25 INJECTION INTRAMUSCULAR; INTRAVENOUS at 15:32

## 2021-11-17 RX ADMIN — LAMOTRIGINE 25 MG: 25 TABLET ORAL at 20:55

## 2021-11-17 RX ADMIN — PIPERACILLIN AND TAZOBACTAM 3375 MG: 3; .375 INJECTION, POWDER, LYOPHILIZED, FOR SOLUTION INTRAVENOUS at 10:29

## 2021-11-17 RX ADMIN — LAMOTRIGINE 25 MG: 25 TABLET ORAL at 08:52

## 2021-11-17 RX ADMIN — PIPERACILLIN AND TAZOBACTAM 3375 MG: 3; .375 INJECTION, POWDER, LYOPHILIZED, FOR SOLUTION INTRAVENOUS at 03:33

## 2021-11-17 RX ADMIN — ACETAMINOPHEN 650 MG: 325 TABLET ORAL at 11:40

## 2021-11-17 RX ADMIN — SODIUM CHLORIDE, PRESERVATIVE FREE 10 ML: 5 INJECTION INTRAVENOUS at 20:55

## 2021-11-17 RX ADMIN — ACETAMINOPHEN 650 MG: 650 SUPPOSITORY RECTAL at 19:42

## 2021-11-17 RX ADMIN — CARIPRAZINE 1.5 MG: 1.5 CAPSULE, GELATIN COATED ORAL at 10:31

## 2021-11-17 RX ADMIN — SODIUM CHLORIDE 25 ML: 9 INJECTION, SOLUTION INTRAVENOUS at 10:28

## 2021-11-17 RX ADMIN — ONDANSETRON 4 MG: 2 INJECTION INTRAMUSCULAR; INTRAVENOUS at 11:41

## 2021-11-17 RX ADMIN — PROMETHAZINE HYDROCHLORIDE 12.5 MG: 25 INJECTION INTRAMUSCULAR; INTRAVENOUS at 08:47

## 2021-11-17 RX ADMIN — ENOXAPARIN SODIUM 40 MG: 100 INJECTION SUBCUTANEOUS at 08:52

## 2021-11-17 RX ADMIN — SODIUM CHLORIDE, PRESERVATIVE FREE 10 ML: 5 INJECTION INTRAVENOUS at 08:49

## 2021-11-17 RX ADMIN — SODIUM CHLORIDE, POTASSIUM CHLORIDE, SODIUM LACTATE AND CALCIUM CHLORIDE: 600; 310; 30; 20 INJECTION, SOLUTION INTRAVENOUS at 23:51

## 2021-11-17 RX ADMIN — PIPERACILLIN AND TAZOBACTAM 3375 MG: 3; .375 INJECTION, POWDER, LYOPHILIZED, FOR SOLUTION INTRAVENOUS at 19:43

## 2021-11-17 ASSESSMENT — PAIN SCALES - GENERAL
PAINLEVEL_OUTOF10: 3
PAINLEVEL_OUTOF10: 4
PAINLEVEL_OUTOF10: 7
PAINLEVEL_OUTOF10: 0
PAINLEVEL_OUTOF10: 0
PAINLEVEL_OUTOF10: 3
PAINLEVEL_OUTOF10: 0
PAINLEVEL_OUTOF10: 4

## 2021-11-17 ASSESSMENT — PAIN DESCRIPTION - PAIN TYPE: TYPE: ACUTE PAIN

## 2021-11-17 ASSESSMENT — PAIN DESCRIPTION - DESCRIPTORS: DESCRIPTORS: CONSTANT

## 2021-11-17 ASSESSMENT — PAIN DESCRIPTION - FREQUENCY: FREQUENCY: CONTINUOUS

## 2021-11-17 ASSESSMENT — PAIN DESCRIPTION - ONSET: ONSET: ON-GOING

## 2021-11-17 NOTE — PLAN OF CARE
Problem: Pain:  Goal: Pain level will decrease  Description: Pain level will decrease  Outcome: Ongoing     Pt given morphine IV at 2354 for c/o 4.5/10 ABD pain; see MAR & all flowsheets. Pt reported pain down to 4/10 at 0024 at pain re-evaluation. Will continue to monitor.

## 2021-11-17 NOTE — PLAN OF CARE
Nausea and Vomiting this shift. Scopolamine patch added to treatment plan. Pain controlled with Tylenol. Patient satisfied.        Problem: Pain:  Goal: Pain level will decrease  Description: Pain level will decrease  11/17/2021 1428 by Bassam Mccormick RN  Outcome: Ongoing     Problem: Cardiovascular  Goal: Hemodynamic stability  Outcome: Ongoing

## 2021-11-17 NOTE — PLAN OF CARE
Problem: Cardiovascular  Goal: Hemodynamic stability  Outcome: Ongoing     Problem: Pain:  Goal: Pain level will decrease  Description: Pain level will decrease  Outcome: Ongoing     Vitals:    11/17/21 0327   BP: 124/81   Pulse: 95   Resp: 14   Temp: 98.9 °F (37.2 °C)   SpO2: 97%     Pt awake and c/o pain & nausea. VSS at 0327, afebrile. New IVF bag hung, Zofran, tylenol, & Zosyn given; see MAR & all flowsheets. Will continue to monitor.

## 2021-11-17 NOTE — PROGRESS NOTES
[facility] PROGRESS NOTE    11/17/2021 11:28 AM        Name: Merline Salvo . Admitted: 11/15/2021  Primary Care Provider: No primary care provider on file. (Tel: None)        Subjective:    Is improved but still significant nausea no shortness of breath or diarrhea    Reviewed interval ancillary notes    Current Medications  scopolamine (TRANSDERM-SCOP) transdermal patch 1 patch, Q72H  promethazine (PHENERGAN) injection 12.5 mg, Q6H PRN  cariprazine hcl (VRAYLAR) capsule 1.5 mg, Daily  lamoTRIgine (LAMICTAL) tablet 25 mg, BID  piperacillin-tazobactam (ZOSYN) 3,375 mg in dextrose 5 % 50 mL IVPB extended infusion (mini-bag), Q8H  morphine (PF) injection 2 mg, Q3H PRN  lactated ringers infusion, Continuous  sodium chloride flush 0.9 % injection 5-40 mL, 2 times per day  sodium chloride flush 0.9 % injection 5-40 mL, PRN  0.9 % sodium chloride infusion, PRN  enoxaparin (LOVENOX) injection 40 mg, Daily  ondansetron (ZOFRAN-ODT) disintegrating tablet 4 mg, Q8H PRN   Or  ondansetron (ZOFRAN) injection 4 mg, Q6H PRN  polyethylene glycol (GLYCOLAX) packet 17 g, Daily PRN  acetaminophen (TYLENOL) tablet 650 mg, Q6H PRN   Or  acetaminophen (TYLENOL) suppository 650 mg, Q6H PRN  influenza quadrivalent split vaccine (FLUZONE;FLUARIX;FLULAVAL;AFLURIA) injection 0.5 mL, Prior to discharge        Objective:  /74   Pulse 100   Temp 98.8 °F (37.1 °C) (Oral)   Resp 16   Ht 5' 8\" (1.727 m)   Wt 131 lb (59.4 kg)   LMP 10/26/2021 (Exact Date)   SpO2 97%   BMI 19.92 kg/m²     Intake/Output Summary (Last 24 hours) at 11/17/2021 1128  Last data filed at 11/17/2021 0858  Gross per 24 hour   Intake 2446.16 ml   Output --   Net 2446.16 ml      Wt Readings from Last 3 Encounters:   11/15/21 131 lb (59.4 kg)       General appearance:  Appears uncomfortable.  AAOx3  HEENT: atraumatic, Pupils equal, muscous membranes moist, no masses appreciated  Cardiovascular: tachycardia no murmurs appreciated cap refill < 2sec  Respiratory: CTAB no wheezing  Gastrointestinal: Abdomen soft, non-tender, BS+ CVA tenderness to palpation  EXT: no edema  Neurology: no gross focal deficts  Psychiatry: Appropriate affect. Not agitated  Skin: Warm, dry, no rashes appreciated       Labs and Tests:  CBC:   Recent Labs     11/15/21  1113 11/16/21  0515 11/17/21  0445   WBC 23.8* 24.2* 23.6*   HGB 12.2 10.8* 10.5*    334 351     BMP:    Recent Labs     11/15/21  1113 11/16/21  0515 11/17/21  0445   * 138 134*   K 3.7 3.8 3.5   CL 97* 104 101   CO2 24 22 22   BUN 12 7 8   CREATININE 0.9 0.8 0.7   GLUCOSE 120* 121* 103*     Hepatic:   Recent Labs     11/15/21  1113   AST 16   ALT 15   BILITOT 0.6   ALKPHOS 116     CT ABDOMEN PELVIS W IV CONTRAST Additional Contrast? None   Final Result   1. Findings are most consistent with asymmetric left pyelonephritis with a   possible developing cortical abscess within the left kidney upper pole. 2. Findings suggestive of cystitis. Recent imaging reviewed    Problem List  Active Problems:    Sepsis ([address]  Resolved Problems:    * No resolved hospital problems.  *     Assessment/Plan:   Sepsis secondary to pyelonephritis with possible cortical abscess  - iv zosyn  - continue ivf  - fu cultures blood negative thus far  - reepat ct in am  - urology on board  - iv morphine prn for pain control     Hyponatremia:improved  DVT prophylaxis lovenox  Code status full code    Glenn Wilson MD   11/17/2021 11:28 AM

## 2021-11-17 NOTE — PLAN OF CARE
Problem: Cardiovascular  Goal: Hemodynamic stability  Outcome: Ongoing     Vitals:    11/16/21 2342   BP: 105/69   Pulse: 90   Resp: 14   Temp: 98.8 °F (37.1 °C)   SpO2: 96%     VSS with temp 98.8, afebrile. See all flowsheets. Will continue to monitor.

## 2021-11-17 NOTE — PLAN OF CARE
Problem: Cardiovascular  Goal: Hemodynamic stability  Outcome: Ongoing     Vitals:    11/16/21 1942   BP: 105/69   Pulse: 110   Resp: 14   Temp: 100.1 °F (37.8 °C)   SpO2: 97%     Pt up to bathroom and back to bed with IV pole; gait steady & lines intact. Pt A/O. VSS at shift change at 1942; see all flowsheets. Will continue to monitor.

## 2021-11-17 NOTE — PROGRESS NOTES
Urology Progress Note  [facility]    Provider: YAHIR Adkins CNP  Patient ID:  Admission Date: 11/15/2021 Name: Indira Villanueva  OR Date: 11/15/2021 MRN: [id number]   Patient Location: [id number] : 2000  Attending: Alin Hutchison MD Date of Service: 2021  PCP: No primary care provider on file. Diagnoses:  Pyelonephritis, Possible Renal Abscess    Assessment/Plan:  25 yo female presents with dysuria and frequency progressing to left flank pain associated with nausea, emesis, chills and fever of 102F. UA is concerning for infection. CT a/p demonstrates left pyelonephritis with possible developing 3.3 x 2.2 cm left upper pole cortical abscess. Leukocytosis is 24.2k today, Cr is stable. Continues to have fever and leukocytosis, 23.6k today.        Recommendations:  Continue antibiotics and follow up on urine culture. Follow fever curve and WBC. Continue abx, E.coli UTI, consider Cefepime? CT scan tomorrow or sooner if clinically worsening.      The patient had a chance to ask questions which were answered. she understands the above plan. Subjective:   Indira Villanueva is a 24 y.o. female. She was seen and examined this morning. Today we discussed repeating CT tomorrow and following up on cultures. Objective:   Vitals:  Vitals:    21 0730   BP: 127/74   Pulse: 100   Resp: 16   Temp: 98.8 °F (37.1 °C)   SpO2: 97%       Intake/Output Summary (Last 24 hours) at 2021 1032  Last data filed at 2021 0858  Gross per 24 hour   Intake 2446.16 ml   Output --   Net 2446.16 ml     Physical Exam:  Gen: Alert and oriented x3, no acute distress  CV: Regular rate   Resp: unlabored respirations  Abd: Soft, non-distended, non-tender, no masses  Ext: no peripheral edema noted, moves upper and lower extremities spontaneously  Skin: warmand well perfused, no rashes noted on the face, or arms.      Labs:  Lab Results   Component Value Date    WBC 23.6 (H) 11/17/2021    HGB 10.5 (L) 11/17/2021    HCT 31.1 (L) 11/17/2021    MCV 89.5 11/17/2021     11/17/2021     Lab Results   Component Value Date    CREATININE 0.7 11/17/2021    BUN 8 11/17/2021     (L) 11/17/2021    K 3.5 11/17/2021     11/17/2021    CO2 22 11/17/2021       Rohit Zambrano, APRN - CNP   11/17/2021

## 2021-11-18 ENCOUNTER — APPOINTMENT (OUTPATIENT)
Dept: CT IMAGING | Age: 21
DRG: 872 | End: 2021-11-18
Payer: COMMERCIAL

## 2021-11-18 LAB
ANION GAP SERPL CALCULATED.3IONS-SCNC: 8 MMOL/L (ref 3–16)
ANISOCYTOSIS: ABNORMAL
BANDED NEUTROPHILS RELATIVE PERCENT: 3 % (ref 0–7)
BASOPHILS ABSOLUTE: 0 K/UL (ref 0–0.2)
BASOPHILS RELATIVE PERCENT: 0 %
BUN BLDV-MCNC: 8 MG/DL (ref 7–20)
CALCIUM SERPL-MCNC: 7.8 MG/DL (ref 8.3–10.6)
CHLORIDE BLD-SCNC: 104 MMOL/L (ref 99–110)
CO2: 23 MMOL/L (ref 21–32)
CREAT SERPL-MCNC: 0.7 MG/DL (ref 0.6–1.1)
EOSINOPHILS ABSOLUTE: 0 K/UL (ref 0–0.6)
EOSINOPHILS RELATIVE PERCENT: 0 %
GFR AFRICAN AMERICAN: >60
GFR NON-AFRICAN AMERICAN: >60
GLUCOSE BLD-MCNC: 102 MG/DL (ref 70–99)
HCT VFR BLD CALC: 30.3 % (ref 36–48)
HEMOGLOBIN: 10.4 G/DL (ref 12–16)
HYPOCHROMIA: ABNORMAL
LYMPHOCYTES ABSOLUTE: 3.1 K/UL (ref 1–5.1)
LYMPHOCYTES RELATIVE PERCENT: 15 %
MAGNESIUM: 1.8 MG/DL (ref 1.8–2.4)
MCH RBC QN AUTO: 30.4 PG (ref 26–34)
MCHC RBC AUTO-ENTMCNC: 34.5 G/DL (ref 31–36)
MCV RBC AUTO: 88.3 FL (ref 80–100)
METAMYELOCYTES RELATIVE PERCENT: 1 %
MONOCYTES ABSOLUTE: 2.1 K/UL (ref 0–1.3)
MONOCYTES RELATIVE PERCENT: 10 %
NEUTROPHILS ABSOLUTE: 15.6 K/UL (ref 1.7–7.7)
NEUTROPHILS RELATIVE PERCENT: 71 %
PDW BLD-RTO: 13.4 % (ref 12.4–15.4)
PLATELET # BLD: 402 K/UL (ref 135–450)
PLATELET SLIDE REVIEW: ADEQUATE
PMV BLD AUTO: 6.4 FL (ref 5–10.5)
POLYCHROMASIA: ABNORMAL
POTASSIUM REFLEX MAGNESIUM: 3.6 MMOL/L (ref 3.5–5.1)
RBC # BLD: 3.43 M/UL (ref 4–5.2)
SLIDE REVIEW: ABNORMAL
SODIUM BLD-SCNC: 135 MMOL/L (ref 136–145)
TOXIC GRANULATION: PRESENT
WBC # BLD: 20.8 K/UL (ref 4–11)

## 2021-11-18 PROCEDURE — 83735 ASSAY OF MAGNESIUM: CPT

## 2021-11-18 PROCEDURE — 6360000004 HC RX CONTRAST MEDICATION

## 2021-11-18 PROCEDURE — 74177 CT ABD & PELVIS W/CONTRAST: CPT

## 2021-11-18 PROCEDURE — 2060000000 HC ICU INTERMEDIATE R&B

## 2021-11-18 PROCEDURE — 6360000002 HC RX W HCPCS: Performed by: INTERNAL MEDICINE

## 2021-11-18 PROCEDURE — 80048 BASIC METABOLIC PNL TOTAL CA: CPT

## 2021-11-18 PROCEDURE — 6360000004 HC RX CONTRAST MEDICATION: Performed by: INTERNAL MEDICINE

## 2021-11-18 PROCEDURE — 85025 COMPLETE CBC W/AUTO DIFF WBC: CPT

## 2021-11-18 PROCEDURE — 2580000003 HC RX 258: Performed by: INTERNAL MEDICINE

## 2021-11-18 PROCEDURE — 6370000000 HC RX 637 (ALT 250 FOR IP): Performed by: INTERNAL MEDICINE

## 2021-11-18 PROCEDURE — 6370000000 HC RX 637 (ALT 250 FOR IP): Performed by: PHYSICIAN ASSISTANT

## 2021-11-18 PROCEDURE — 36415 COLL VENOUS BLD VENIPUNCTURE: CPT

## 2021-11-18 RX ORDER — LORAZEPAM 2 MG/ML
1 INJECTION INTRAMUSCULAR ONCE
Status: COMPLETED | OUTPATIENT
Start: 2021-11-18 | End: 2021-11-18

## 2021-11-18 RX ORDER — LORAZEPAM 2 MG/ML
1 INJECTION INTRAMUSCULAR ONCE
Status: COMPLETED | OUTPATIENT
Start: 2021-11-18 | End: 2021-11-19

## 2021-11-18 RX ADMIN — PIPERACILLIN AND TAZOBACTAM 3375 MG: 3; .375 INJECTION, POWDER, LYOPHILIZED, FOR SOLUTION INTRAVENOUS at 19:34

## 2021-11-18 RX ADMIN — IOPAMIDOL 75 ML: 755 INJECTION, SOLUTION INTRAVENOUS at 15:51

## 2021-11-18 RX ADMIN — SODIUM CHLORIDE, PRESERVATIVE FREE 10 ML: 5 INJECTION INTRAVENOUS at 19:51

## 2021-11-18 RX ADMIN — ACETAMINOPHEN 650 MG: 325 TABLET ORAL at 07:42

## 2021-11-18 RX ADMIN — PIPERACILLIN AND TAZOBACTAM 3375 MG: 3; .375 INJECTION, POWDER, LYOPHILIZED, FOR SOLUTION INTRAVENOUS at 04:21

## 2021-11-18 RX ADMIN — ENOXAPARIN SODIUM 40 MG: 100 INJECTION SUBCUTANEOUS at 09:43

## 2021-11-18 RX ADMIN — LAMOTRIGINE 25 MG: 25 TABLET ORAL at 19:51

## 2021-11-18 RX ADMIN — LORAZEPAM 1 MG: 2 INJECTION INTRAMUSCULAR; INTRAVENOUS at 12:59

## 2021-11-18 RX ADMIN — LAMOTRIGINE 25 MG: 25 TABLET ORAL at 09:42

## 2021-11-18 RX ADMIN — PIPERACILLIN AND TAZOBACTAM 3375 MG: 3; .375 INJECTION, POWDER, LYOPHILIZED, FOR SOLUTION INTRAVENOUS at 11:10

## 2021-11-18 RX ADMIN — SODIUM CHLORIDE, POTASSIUM CHLORIDE, SODIUM LACTATE AND CALCIUM CHLORIDE: 600; 310; 30; 20 INJECTION, SOLUTION INTRAVENOUS at 09:45

## 2021-11-18 RX ADMIN — ONDANSETRON 4 MG: 4 TABLET, ORALLY DISINTEGRATING ORAL at 11:06

## 2021-11-18 RX ADMIN — IOHEXOL 50 ML: 240 INJECTION, SOLUTION INTRATHECAL; INTRAVASCULAR; INTRAVENOUS; ORAL at 11:08

## 2021-11-18 RX ADMIN — CARIPRAZINE 1.5 MG: 1.5 CAPSULE, GELATIN COATED ORAL at 09:42

## 2021-11-18 ASSESSMENT — PAIN DESCRIPTION - LOCATION
LOCATION: ABDOMEN

## 2021-11-18 ASSESSMENT — PAIN DESCRIPTION - PAIN TYPE
TYPE: ACUTE PAIN

## 2021-11-18 ASSESSMENT — PAIN DESCRIPTION - ONSET
ONSET: ON-GOING

## 2021-11-18 ASSESSMENT — PAIN SCALES - GENERAL
PAINLEVEL_OUTOF10: 0
PAINLEVEL_OUTOF10: 3
PAINLEVEL_OUTOF10: 6
PAINLEVEL_OUTOF10: 3
PAINLEVEL_OUTOF10: 0
PAINLEVEL_OUTOF10: 0

## 2021-11-18 ASSESSMENT — PAIN DESCRIPTION - FREQUENCY
FREQUENCY: CONTINUOUS

## 2021-11-18 ASSESSMENT — PAIN DESCRIPTION - DESCRIPTORS
DESCRIPTORS: CONSTANT

## 2021-11-18 NOTE — PLAN OF CARE
Problem: Cardiovascular  Goal: Hemodynamic stability  Outcome: Ongoing     101.2 °F (38.4 °C)   by Yarely Bennett RN at 11/17/21 2048     VSS with temp elevated 102.4 PO at shift change at 1929. Day RN gave Tylenol suppository at 1942. Temp recheck 101.2 at 2048. See all flowsheets. Will continue to monitor.      102.4 °F (39.1 °C)   by Yarely Bennett RN at 11/17/21 1929

## 2021-11-18 NOTE — PROGRESS NOTES
[facility]    11/18/2021 11:56 AM        Name: Axel Cutler . Admitted: 11/15/2021  Primary Care Provider: No primary care provider on file. (Tel: None)        Subjective:    Lying in bed pain is improved nausea improved still spiking fevers overnight    Reviewed interval ancillary notes    Current Medications  scopolamine (TRANSDERM-SCOP) transdermal patch 1 patch, Q72H  promethazine (PHENERGAN) injection 12.5 mg, Q6H PRN  cariprazine hcl (VRAYLAR) capsule 1.5 mg, Daily  lamoTRIgine (LAMICTAL) tablet 25 mg, BID  piperacillin-tazobactam (ZOSYN) 3,375 mg in dextrose 5 % 50 mL IVPB extended infusion (mini-bag), Q8H  morphine (PF) injection 2 mg, Q3H PRN  lactated ringers infusion, Continuous  sodium chloride flush 0.9 % injection 5-40 mL, 2 times per day  sodium chloride flush 0.9 % injection 5-40 mL, PRN  0.9 % sodium chloride infusion, PRN  enoxaparin (LOVENOX) injection 40 mg, Daily  ondansetron (ZOFRAN-ODT) disintegrating tablet 4 mg, Q8H PRN   Or  ondansetron (ZOFRAN) injection 4 mg, Q6H PRN  polyethylene glycol (GLYCOLAX) packet 17 g, Daily PRN  acetaminophen (TYLENOL) tablet 650 mg, Q6H PRN   Or  acetaminophen (TYLENOL) suppository 650 mg, Q6H PRN  influenza quadrivalent split vaccine (FLUZONE;FLUARIX;FLULAVAL;AFLURIA) injection 0.5 mL, Prior to discharge        Objective:  /82   Pulse 85   Temp 99.3 °F (37.4 °C) (Oral)   Resp 14   Ht 5' 8\" (1.727 m)   Wt 132 lb 8 oz (60.1 kg)   LMP 10/26/2021 (Exact Date)   SpO2 97%   BMI 20.15 kg/m²     Intake/Output Summary (Last 24 hours) at 11/18/2021 1156  Last data filed at 11/18/2021 1007  Gross per 24 hour   Intake 2662.54 ml   Output --   Net 2662.54 ml      Wt Readings from Last 3 Encounters:   11/18/21 132 lb 8 oz (60.1 kg)       General appearance:  Appears uncomfortable.  AAOx3  HEENT: atraumatic, Pupils equal, muscous membranes moist, no masses appreciated  Cardiovascular: rrr no murmurs appreciated cap refill < 2sec  Respiratory: CTAB no wheezing  Gastrointestinal: Abdomen soft, non-tender, BS+ CVA tenderness to palpation  EXT: no edema  Neurology: no gross focal deficts  Psychiatry: Appropriate affect. Not agitated  Skin: Warm, dry, no rashes appreciated       Labs and Tests:  CBC:   Recent Labs     11/16/21  0515 11/17/21  0445 11/18/21  0450   WBC 24.2* 23.6* 20.8*   HGB 10.8* 10.5* 10.4*    351 402     BMP:    Recent Labs     11/16/21  0515 11/17/21  0445 11/18/21  0450    134* 135*   K 3.8 3.5 3.6    101 104   CO2 22 22 23   BUN 7 8 8   CREATININE 0.8 0.7 0.7   GLUCOSE 121* 103* 102*     Hepatic:   No results for input(s): AST, ALT, ALB, BILITOT, ALKPHOS in the last 72 hours. CT ABDOMEN PELVIS W IV CONTRAST Additional Contrast? None   Final Result   1. Findings are most consistent with asymmetric left pyelonephritis with a   possible developing cortical abscess within the left kidney upper pole. 2. Findings suggestive of cystitis. CT ABDOMEN PELVIS W IV CONTRAST Additional Contrast? Radiologist Recommendation    (Results Pending)       Recent imaging reviewed    Problem List  Active Problems:    Sepsis ([address]  Resolved Problems:    * No resolved hospital problems.  *     Assessment/Plan:   Sepsis secondary to pyelonephritis with possible cortical abscess  - iv zosyn  -repeat CT scan now  Hyponatremia:improved  DVT prophylaxis lovenox  Code status full code    Elizabeth Han MD   11/18/2021 11:56 AM

## 2021-11-18 NOTE — PLAN OF CARE
Problem: Cardiovascular  Goal: Hemodynamic stability  Outcome: Ongoing     Vitals:    11/18/21 0330   BP: 121/75   Pulse: 87   Resp: 14   Temp: 100.1 °F (37.8 °C)   SpO2: 96%     Pt awake/asleep on/off overnight. VSS with temp 100.1 at 0330. Will recheck temp after AM labs drawn. See all flowsheets. Will continue to monitor.

## 2021-11-18 NOTE — FLOWSHEET NOTE
Amy Lara at  9:30 PM via [employer]:       Sepsis Risk Score Best Practice Advisory alert coming up for early detection of sepsis, indicates that pt is a high risk (score is >5 and alerting as \"High 7.38\") Orders for Lactate, CBC, and blood cultures x2 are alerting to be placed. Message Read 9:30 PM. Hospitalist responded at 9:35 PM, \"Has anything changed? Looks like she's had fever and leukocytosis, BP and HR look stable. Urology said consider CT tomorrow or sooner if clinically worsening. That's why I'm questioning whether or not there are changes because everything looks stable to me unless there's something I'm not seeing\"       RN responded at 9:36 PM in [employer], \"I think it just red-flagged because that temperature was higher than she had been. I will continue to monitor her and document against the Best Practice Advisory but just wanted to update you. \" \"I too wanted to make sure I wasn't missing something. Thank you for reviewing her chart and evaluating\" added at [phone]. Hospitalist Read at 9:38 PM and responded, \"Perfect.  Thank you \"

## 2021-11-18 NOTE — FLOWSHEET NOTE
[employer] messaged via [employer] at 8:51 PM:      FYI: Temp was 102.4 at [address] at shift change. Tylenol supp given at [address] per day RN. Recheck 101.2 at 2048      Message marked as Read at 8:52 PM; no new orders obtained.

## 2021-11-18 NOTE — PROGRESS NOTES
Urology Progress Note  [facility]    Provider: YAHIR Pisano CNP  Patient ID:  Admission Date: 11/15/2021 Name: Robert Lujan  OR Date: 11/15/2021 MRN: [id number]   Patient Location: [id number] : 2000  Attending: Laura Carpio MD Date of Service: 2021  PCP: No primary care provider on file. Diagnoses:  Pyelonephritis, Possible Renal Abscess    Assessment/Plan:  23 yo female presents with dysuria and frequency progressing to left flank pain associated with nausea, emesis, chills and fever of 102F. UA is concerning for infection. CT a/p demonstrates left pyelonephritis with possible developing 3.3 x 2.2 cm left upper pole cortical abscess. Leukocytosis is 24.2k today, Cr is stable. Continues to have fever and leukocytosis, 20,8k today.        Recommendations:  Continue antibiotics and follow up on urine culture. Follow fever curve and WBC. Repeat CT a.p today, will f/u on results.     The patient had a chance to ask questions which were answered. she understands the above plan. Subjective:   Robert Lujan is a 24 y.o. female. She was seen and examined this morning. Today we discussed repeating CT tomorrow and following up on cultures. Objective:   Vitals:  Vitals:    21 0725   BP: 130/82   Pulse: 85   Resp: 14   Temp: 100.6 °F (38.1 °C)   SpO2: 97%       Intake/Output Summary (Last 24 hours) at 2021 0815  Last data filed at 2021 2255  Gross per 24 hour   Intake 2781.87 ml   Output --   Net 2781.87 ml     Physical Exam:  Gen: Alert and oriented x3, no acute distress  CV: Regular rate   Resp: unlabored respirations  Abd: Soft, non-distended, non-tender, no masses  Ext: no peripheral edema noted, moves upper and lower extremities spontaneously  Skin: warmand well perfused, no rashes noted on the face, or arms.      Labs:  Lab Results   Component Value Date    WBC 20.8 (H) 2021    HGB 10.4 (L) 2021    HCT 30.3 (L) 11/18/2021    MCV 88.3 11/18/2021     11/18/2021     Lab Results   Component Value Date    CREATININE 0.7 11/18/2021    BUN 8 11/18/2021     (L) 11/18/2021    K 3.6 11/18/2021     11/18/2021    CO2 23 11/18/2021       Blinda Betzaida, APRN - CNP   11/18/2021

## 2021-11-19 LAB
ANION GAP SERPL CALCULATED.3IONS-SCNC: 10 MMOL/L (ref 3–16)
BANDED NEUTROPHILS RELATIVE PERCENT: 1 % (ref 0–7)
BASOPHILS ABSOLUTE: 0 K/UL (ref 0–0.2)
BASOPHILS RELATIVE PERCENT: 0 %
BLOOD CULTURE, ROUTINE: NORMAL
BUN BLDV-MCNC: 6 MG/DL (ref 7–20)
CALCIUM SERPL-MCNC: 7.7 MG/DL (ref 8.3–10.6)
CHLORIDE BLD-SCNC: 102 MMOL/L (ref 99–110)
CO2: 23 MMOL/L (ref 21–32)
CREAT SERPL-MCNC: 0.7 MG/DL (ref 0.6–1.1)
CULTURE, BLOOD 2: NORMAL
EOSINOPHILS ABSOLUTE: 0.3 K/UL (ref 0–0.6)
EOSINOPHILS RELATIVE PERCENT: 2 %
GFR AFRICAN AMERICAN: >60
GFR NON-AFRICAN AMERICAN: >60
GLUCOSE BLD-MCNC: 105 MG/DL (ref 70–99)
HCT VFR BLD CALC: 31.5 % (ref 36–48)
HEMOGLOBIN: 10.6 G/DL (ref 12–16)
LYMPHOCYTES ABSOLUTE: 3.8 K/UL (ref 1–5.1)
LYMPHOCYTES RELATIVE PERCENT: 23 %
MAGNESIUM: 1.9 MG/DL (ref 1.8–2.4)
MCH RBC QN AUTO: 29.6 PG (ref 26–34)
MCHC RBC AUTO-ENTMCNC: 33.6 G/DL (ref 31–36)
MCV RBC AUTO: 88.1 FL (ref 80–100)
METAMYELOCYTES RELATIVE PERCENT: 4 %
MONOCYTES ABSOLUTE: 1.5 K/UL (ref 0–1.3)
MONOCYTES RELATIVE PERCENT: 9 %
NEUTROPHILS ABSOLUTE: 10.9 K/UL (ref 1.7–7.7)
NEUTROPHILS RELATIVE PERCENT: 61 %
PDW BLD-RTO: 13.2 % (ref 12.4–15.4)
PLATELET # BLD: 489 K/UL (ref 135–450)
PLATELET SLIDE REVIEW: ABNORMAL
PMV BLD AUTO: 6.6 FL (ref 5–10.5)
POTASSIUM REFLEX MAGNESIUM: 3.5 MMOL/L (ref 3.5–5.1)
RBC # BLD: 3.58 M/UL (ref 4–5.2)
RBC # BLD: NORMAL 10*6/UL
SLIDE REVIEW: ABNORMAL
SODIUM BLD-SCNC: 135 MMOL/L (ref 136–145)
WBC # BLD: 16.5 K/UL (ref 4–11)

## 2021-11-19 PROCEDURE — 6370000000 HC RX 637 (ALT 250 FOR IP): Performed by: PHYSICIAN ASSISTANT

## 2021-11-19 PROCEDURE — 80048 BASIC METABOLIC PNL TOTAL CA: CPT

## 2021-11-19 PROCEDURE — 36415 COLL VENOUS BLD VENIPUNCTURE: CPT

## 2021-11-19 PROCEDURE — 2580000003 HC RX 258: Performed by: INTERNAL MEDICINE

## 2021-11-19 PROCEDURE — 6360000002 HC RX W HCPCS: Performed by: INTERNAL MEDICINE

## 2021-11-19 PROCEDURE — 83735 ASSAY OF MAGNESIUM: CPT

## 2021-11-19 PROCEDURE — 6360000002 HC RX W HCPCS: Performed by: PHYSICIAN ASSISTANT

## 2021-11-19 PROCEDURE — 85025 COMPLETE CBC W/AUTO DIFF WBC: CPT

## 2021-11-19 PROCEDURE — 6370000000 HC RX 637 (ALT 250 FOR IP): Performed by: INTERNAL MEDICINE

## 2021-11-19 PROCEDURE — 2060000000 HC ICU INTERMEDIATE R&B

## 2021-11-19 RX ADMIN — LAMOTRIGINE 25 MG: 25 TABLET ORAL at 08:21

## 2021-11-19 RX ADMIN — PIPERACILLIN AND TAZOBACTAM 3375 MG: 3; .375 INJECTION, POWDER, LYOPHILIZED, FOR SOLUTION INTRAVENOUS at 03:23

## 2021-11-19 RX ADMIN — SODIUM CHLORIDE, PRESERVATIVE FREE 10 ML: 5 INJECTION INTRAVENOUS at 08:21

## 2021-11-19 RX ADMIN — CARIPRAZINE 1.5 MG: 1.5 CAPSULE, GELATIN COATED ORAL at 08:21

## 2021-11-19 RX ADMIN — PIPERACILLIN AND TAZOBACTAM 3375 MG: 3; .375 INJECTION, POWDER, LYOPHILIZED, FOR SOLUTION INTRAVENOUS at 19:11

## 2021-11-19 RX ADMIN — LAMOTRIGINE 25 MG: 25 TABLET ORAL at 19:38

## 2021-11-19 RX ADMIN — PIPERACILLIN AND TAZOBACTAM 3375 MG: 3; .375 INJECTION, POWDER, LYOPHILIZED, FOR SOLUTION INTRAVENOUS at 12:08

## 2021-11-19 RX ADMIN — SODIUM CHLORIDE, POTASSIUM CHLORIDE, SODIUM LACTATE AND CALCIUM CHLORIDE: 600; 310; 30; 20 INJECTION, SOLUTION INTRAVENOUS at 07:25

## 2021-11-19 RX ADMIN — SODIUM CHLORIDE, POTASSIUM CHLORIDE, SODIUM LACTATE AND CALCIUM CHLORIDE: 600; 310; 30; 20 INJECTION, SOLUTION INTRAVENOUS at 19:09

## 2021-11-19 RX ADMIN — LORAZEPAM 1 MG: 2 INJECTION INTRAMUSCULAR; INTRAVENOUS at 03:25

## 2021-11-19 RX ADMIN — ENOXAPARIN SODIUM 40 MG: 100 INJECTION SUBCUTANEOUS at 08:20

## 2021-11-19 RX ADMIN — ACETAMINOPHEN 650 MG: 325 TABLET ORAL at 02:31

## 2021-11-19 RX ADMIN — SODIUM CHLORIDE, POTASSIUM CHLORIDE, SODIUM LACTATE AND CALCIUM CHLORIDE: 600; 310; 30; 20 INJECTION, SOLUTION INTRAVENOUS at 12:09

## 2021-11-19 ASSESSMENT — PAIN SCALES - GENERAL
PAINLEVEL_OUTOF10: 0
PAINLEVEL_OUTOF10: 3
PAINLEVEL_OUTOF10: 0
PAINLEVEL_OUTOF10: 0

## 2021-11-19 NOTE — PROGRESS NOTES
[facility] PROGRESS NOTE    11/19/2021 10:29 AM        Name: Alvina Watkins . Admitted: 11/15/2021  Primary Care Provider: No primary care provider on file. (Tel: None)        Subjective:    Lying in bed nausea improving pain improved did have fever of 100 once overnight    Reviewed interval ancillary notes    Current Medications  scopolamine (TRANSDERM-SCOP) transdermal patch 1 patch, Q72H  promethazine (PHENERGAN) injection 12.5 mg, Q6H PRN  cariprazine hcl (VRAYLAR) capsule 1.5 mg, Daily  lamoTRIgine (LAMICTAL) tablet 25 mg, BID  piperacillin-tazobactam (ZOSYN) 3,375 mg in dextrose 5 % 50 mL IVPB extended infusion (mini-bag), Q8H  morphine (PF) injection 2 mg, Q3H PRN  lactated ringers infusion, Continuous  sodium chloride flush 0.9 % injection 5-40 mL, 2 times per day  sodium chloride flush 0.9 % injection 5-40 mL, PRN  0.9 % sodium chloride infusion, PRN  enoxaparin (LOVENOX) injection 40 mg, Daily  ondansetron (ZOFRAN-ODT) disintegrating tablet 4 mg, Q8H PRN   Or  ondansetron (ZOFRAN) injection 4 mg, Q6H PRN  polyethylene glycol (GLYCOLAX) packet 17 g, Daily PRN  acetaminophen (TYLENOL) tablet 650 mg, Q6H PRN   Or  acetaminophen (TYLENOL) suppository 650 mg, Q6H PRN  influenza quadrivalent split vaccine (FLUZONE;FLUARIX;FLULAVAL;AFLURIA) injection 0.5 mL, Prior to discharge        Objective:  /83   Pulse 71   Temp 98.6 °F (37 °C) (Oral)   Resp 18   Ht 5' 8\" (1.727 m)   Wt 133 lb 9.6 oz (60.6 kg)   LMP 10/26/2021 (Exact Date)   SpO2 98%   BMI 20.31 kg/m²     Intake/Output Summary (Last 24 hours) at 11/19/2021 1029  Last data filed at 11/19/2021 0820  Gross per 24 hour   Intake 1262 ml   Output --   Net 1262 ml      Wt Readings from Last 3 Encounters:   11/19/21 133 lb 9.6 oz (60.6 kg)       General appearance:  Appears uncomfortable.  AAOx3  HEENT: atraumatic, Pupils equal, muscous membranes moist, no masses appreciated  Cardiovascular: rrr no murmurs appreciated cap refill < 2sec  Respiratory: CTAB no wheezing  Gastrointestinal: Abdomen soft, non-tender, BS+   EXT: no edema  Neurology: no gross focal deficts  Psychiatry: Appropriate affect. Not agitated  Skin: Warm, dry, no rashes appreciated       Labs and Tests:  CBC:   Recent Labs     11/17/21 0445 11/18/21 0450 11/19/21  0439   WBC 23.6* 20.8* 16.5*   HGB 10.5* 10.4* 10.6*    402 489*     BMP:    Recent Labs     11/17/21 0445 11/18/21 0450 11/19/21  0439   * 135* 135*   K 3.5 3.6 3.5    104 102   CO2 22 23 23   BUN 8 8 6*   CREATININE 0.7 0.7 0.7   GLUCOSE 103* 102* 105*     Hepatic:   No results for input(s): AST, ALT, ALB, BILITOT, ALKPHOS in the last 72 hours. CT ABDOMEN PELVIS W IV CONTRAST Additional Contrast? Radiologist Recommendation   Final Result   Heterogeneous asymmetric enlargement of the left kidney with several small   areas of low attenuation in the upper pole left kidney. Findings again   concerning for pyelonephritis with small developing intraparenchymal abscess   formation. There is been no significant change compared to prior study. No   drainable fluid collection identified. New edema within the body wall soft tissues and mesentery. New small bilateral pleural effusions with minimal left basilar airspace   disease, likely atelectasis. Small amount of free fluid is seen within the pelvis. Bladder wall appears thickened. Findings may be artifactual due to   nondistention versus cystitis, similar to prior. CT ABDOMEN PELVIS W IV CONTRAST Additional Contrast? None   Final Result   1. Findings are most consistent with asymmetric left pyelonephritis with a   possible developing cortical abscess within the left kidney upper pole. 2. Findings suggestive of cystitis.              Recent imaging reviewed    Problem List  Active Problems:    Sepsis ([address]  Resolved Problems:    * No resolved hospital problems.  *     Assessment/Plan:   Sepsis secondary to pyelonephritis with possible cortical abscess, repeat ct stable  - iv zosyn  -repeat CT scan now    Hyponatremia:improved  DVT prophylaxis lovenox  Code status full code    Alin Hutchison MD   11/19/2021 10:29 AM

## 2021-11-19 NOTE — PROGRESS NOTES
arms.     Labs:  Lab Results   Component Value Date    WBC 16.5 (H) 11/19/2021    HGB 10.6 (L) 11/19/2021    HCT 31.5 (L) 11/19/2021    MCV 88.1 11/19/2021     (H) 11/19/2021     Lab Results   Component Value Date    CREATININE 0.7 11/19/2021    BUN 6 (L) 11/19/2021     (L) 11/19/2021    K 3.5 11/19/2021     11/19/2021    CO2 23 11/19/2021       Shamika Gautam, APRN - CNP   11/19/2021

## 2021-11-20 VITALS
TEMPERATURE: 99.1 F | OXYGEN SATURATION: 98 % | HEART RATE: 83 BPM | HEIGHT: 68 IN | DIASTOLIC BLOOD PRESSURE: 84 MMHG | SYSTOLIC BLOOD PRESSURE: 132 MMHG | WEIGHT: 133.6 LBS | BODY MASS INDEX: 20.25 KG/M2 | RESPIRATION RATE: 18 BRPM

## 2021-11-20 LAB
ANION GAP SERPL CALCULATED.3IONS-SCNC: 8 MMOL/L (ref 3–16)
BUN BLDV-MCNC: 4 MG/DL (ref 7–20)
CALCIUM SERPL-MCNC: 7.9 MG/DL (ref 8.3–10.6)
CHLORIDE BLD-SCNC: 103 MMOL/L (ref 99–110)
CO2: 25 MMOL/L (ref 21–32)
CREAT SERPL-MCNC: 0.7 MG/DL (ref 0.6–1.1)
GFR AFRICAN AMERICAN: >60
GFR NON-AFRICAN AMERICAN: >60
GLUCOSE BLD-MCNC: 110 MG/DL (ref 70–99)
POTASSIUM REFLEX MAGNESIUM: 3.6 MMOL/L (ref 3.5–5.1)
SODIUM BLD-SCNC: 136 MMOL/L (ref 136–145)

## 2021-11-20 PROCEDURE — 6360000002 HC RX W HCPCS: Performed by: INTERNAL MEDICINE

## 2021-11-20 PROCEDURE — G0008 ADMIN INFLUENZA VIRUS VAC: HCPCS | Performed by: INTERNAL MEDICINE

## 2021-11-20 PROCEDURE — 36415 COLL VENOUS BLD VENIPUNCTURE: CPT

## 2021-11-20 PROCEDURE — 6370000000 HC RX 637 (ALT 250 FOR IP): Performed by: PHYSICIAN ASSISTANT

## 2021-11-20 PROCEDURE — 85025 COMPLETE CBC W/AUTO DIFF WBC: CPT

## 2021-11-20 PROCEDURE — 90686 IIV4 VACC NO PRSV 0.5 ML IM: CPT | Performed by: INTERNAL MEDICINE

## 2021-11-20 PROCEDURE — 80048 BASIC METABOLIC PNL TOTAL CA: CPT

## 2021-11-20 PROCEDURE — 2580000003 HC RX 258: Performed by: INTERNAL MEDICINE

## 2021-11-20 RX ORDER — AMOXICILLIN AND CLAVULANATE POTASSIUM 500; 125 MG/1; MG/1
1 TABLET, FILM COATED ORAL 2 TIMES DAILY
Qty: 14 TABLET | Refills: 0 | Status: SHIPPED | OUTPATIENT
Start: 2021-11-20 | End: 2021-11-27

## 2021-11-20 RX ADMIN — PIPERACILLIN AND TAZOBACTAM 3375 MG: 3; .375 INJECTION, POWDER, LYOPHILIZED, FOR SOLUTION INTRAVENOUS at 03:06

## 2021-11-20 RX ADMIN — CARIPRAZINE 1.5 MG: 1.5 CAPSULE, GELATIN COATED ORAL at 09:37

## 2021-11-20 RX ADMIN — ENOXAPARIN SODIUM 40 MG: 100 INJECTION SUBCUTANEOUS at 09:37

## 2021-11-20 RX ADMIN — SODIUM CHLORIDE, PRESERVATIVE FREE 10 ML: 5 INJECTION INTRAVENOUS at 09:38

## 2021-11-20 RX ADMIN — LAMOTRIGINE 25 MG: 25 TABLET ORAL at 09:37

## 2021-11-20 RX ADMIN — INFLUENZA A VIRUS A/VICTORIA/2570/2019 IVR-215 (H1N1) ANTIGEN (PROPIOLACTONE INACTIVATED), INFLUENZA A VIRUS A/CAMBODIA/E0826360/2020 IVR-224 (H3N2) ANTIGEN (PROPIOLACTONE INACTIVATED), INFLUENZA B VIRUS B/VICTORIA/705/2018 BVR-11 ANTIGEN (PROPIOLACTONE INACTIVATED), INFLUENZA B VIRUS B/PHUKET/3073/2013 BVR-1B ANTIGEN (PROPIOLACTONE INACTIVATED) 0.5 ML: 15; 15; 15; 15 INJECTION, SUSPENSION INTRAMUSCULAR at 12:45

## 2021-11-20 ASSESSMENT — PAIN SCALES - GENERAL
PAINLEVEL_OUTOF10: 0
PAINLEVEL_OUTOF10: 1

## 2021-11-20 NOTE — DISCHARGE SUMMARY
Hospital Medicine Discharge Summary    Patient: Migdalia Yanez     Gender: female  : 2000   Age: 24 y.o. MRN: [id number]    Admitting Physician: Justyn Granados MD  Discharge Physician: Justyn Granados MD     Code Status: Full Code     Admit Date: 11/15/2021   Discharge Date:   21    Disposition:  Home  Time spent arranging discharge: 35 minutes    Discharge Diagnoses: Active Hospital Problems    Diagnosis Date Noted    Sepsis [facility]) [A41.9] 11/15/2021   Sepsis secondary to pyelonephritis   hyponatremia  Condition at Discharge:  Stable    Hospital Course: The hospital with sepsis secondary to pyelonephritis initial CT showed possible cortical abscess repeat CT showed no abscess or drainable fluid collection. Patient was treated with Zosyn initially had fevers which resolved white count trended down was discharged with Augmentin. Discharge Exam:    /84   Pulse 83   Temp 99.1 °F (37.3 °C) (Oral)   Resp 18   Ht 5' 8\" (1.727 m)   Wt 133 lb 9.6 oz (60.6 kg)   LMP 10/26/2021 (Exact Date)   SpO2 98%   BMI 20.31 kg/m²   General appearance:  Appears comfortable. AAOx3  HEENT: atraumatic, Pupils equal, muscous membranes moist, no masses appreciated  Cardiovascular: Regular rate and rhythm no murmurs appreciated  Respiratory: CTAB no wheezing  Gastrointestinal: Abdomen soft, non-tender, BS+  EXT: no edema  Neurology: no gross focal deficts  Psychiatry: Appropriate affect.  Not agitated  Skin: Warm, dry, no rashes appreciated    Discharge Medications:   Current Discharge Medication List      START taking these medications    Details   amoxicillin-clavulanate (AUGMENTIN) 500-125 MG per tablet Take 1 tablet by mouth 2 times daily for 7 days  Qty: 14 tablet, Refills: 0           Current Discharge Medication List        Current Discharge Medication List      CONTINUE these medications which have NOT CHANGED    Details   cariprazine hcl (VRAYLAR) 1.5 MG capsule Take 1.5 mg by mouth daily       lamoTRIgine (LAMICTAL) 25 MG tablet Take 25 mg by mouth 2 times daily            Current Discharge Medication List          Labs: For convenience and continuity at follow-up the following most recent labs are provided:    Lab Results   Component Value Date    WBC 15.4 11/20/2021    HGB 11.4 11/20/2021    HCT 33.5 11/20/2021    MCV 88.3 11/20/2021     11/20/2021     11/20/2021    K 3.6 11/20/2021     11/20/2021    CO2 25 11/20/2021    BUN 4 11/20/2021    CREATININE 0.7 11/20/2021    CALCIUM 7.9 11/20/2021    ALKPHOS 116 11/15/2021    ALT 15 11/15/2021    AST 16 11/15/2021    BILITOT 0.6 11/15/2021    LABALBU 3.1 11/15/2021     No results found for: INR    Radiology:  CT ABDOMEN PELVIS W IV CONTRAST Additional Contrast? Radiologist Recommendation    Result Date: 11/18/2021  EXAMINATION: CT OF THE ABDOMEN AND PELVIS WITH CONTRAST 11/18/2021 3:44 pm TECHNIQUE: CT of the abdomen and pelvis was performed with the administration of intravenous contrast. Multiplanar reformatted images are provided for review. Dose modulation, iterative reconstruction, and/or weight based adjustment of the mA/kV was utilized to reduce the radiation dose to as low as reasonably achievable. COMPARISON: CT abdomen and pelvis November 15, 2021 and priors. HISTORY: ORDERING SYSTEM PROVIDED HISTORY: r/o abcess TECHNOLOGIST PROVIDED HISTORY: Reason for exam:->r/o abcess Additional Contrast?->Radiologist Recommendation Reason for Exam: r/o abcess Acuity: Unknown Type of Exam: Unknown Relevant Medical/Surgical History: Pt not able to get much PO contrast down FINDINGS: Lower Chest: There are small simple appearing bilateral pleural effusions. There is minimal adjacent left basilar airspace disease, likely atelectasis. Organs: Heterogeneous low attenuation is again seen in the superior pole left kidney. There are areas of more focal low attenuation which are scattered throughout the renal parenchyma.   Findings are overall similar in appearance to prior. No discrete drainable fluid collection identified. There is mild adjacent left perinephric stranding. There is mild asymmetric enlargement of the left kidney. Minimal left-sided hydronephrosis. Right kidney is unremarkable without evidence of hydronephrosis. The liver, spleen, pancreas, gallbladder, and adrenal glands are without acute process. GI/Bowel: Stomach is grossly unremarkable. Oral contrast is seen within distal small bowel loops in the proximal colon. No areas of focal wall thickening or obstruction identified. Gas is seen within the appendix. There is some stranding surrounding the appendix which appears to be due to the patient's diffuse mesenteric edema as appendix is otherwise unremarkable. Pelvis: Bladder is collapsed, limiting evaluation. Wall appears mildly thickened. Uterus and adnexa are without acute process. Small amount of free fluid is seen within the pelvis, most pronounced over the dome of the bladder. There is presacral stranding. Peritoneum/Retroperitoneum: Visualized vasculature is without acute process. No lymphadenopathy identified. There is no free intraperitoneal air. There is mild diffuse mesenteric edema which appears new. Bones/Soft Tissues: There is diffuse edema within the body wall which is new compared to prior. Soft tissues and osseous structures are without acute process. Heterogeneous asymmetric enlargement of the left kidney with several small areas of low attenuation in the upper pole left kidney. Findings again concerning for pyelonephritis with small developing intraparenchymal abscess formation. There is been no significant change compared to prior study. No drainable fluid collection identified. New edema within the body wall soft tissues and mesentery. New small bilateral pleural effusions with minimal left basilar airspace disease, likely atelectasis. Small amount of free fluid is seen within the pelvis. Bladder wall appears thickened. Findings may be artifactual due to nondistention versus cystitis, similar to prior. CT ABDOMEN PELVIS W IV CONTRAST Additional Contrast? None    Result Date: 11/16/2021  EXAMINATION: CT OF THE ABDOMEN AND PELVIS WITH CONTRAST, 11/15/2021 12:02 pm TECHNIQUE: CT of the abdomen and pelvis was performed with the administration of intravenous contrast. Multiplanar reformatted images are provided for review. Dose modulation, iterative reconstruction, and/or weight based adjustment of the mA/kV was utilized to reduce the radiation dose to as low as reasonably achievable. COMPARISON: None HISTORY: ORDERING SYSTEM PROVIDED HISTORY:  Generalized abdominal pain TECHNOLOGIST PROVIDED HISTORY: Reason for Exam:  Generalized abdominal pain Additional Contrast?  None Decision Support Exception - unselect if not a suspected or confirmed emergency medical condition->Emergency Medical Condition (MA) Reason for Exam:  Generalized abdominal pain Acuity:  Unknown Type of Exam:  Unknown FINDINGS: Lower Chest: The lung bases are clear. Organs: The liver and spleen are normal.  The pancreas is unremarkable. The gallbladder is normal.  The adrenal glands are normal bilaterally. There are somewhat lobular foci of cortical low-attenuation throughout the left renal cortex, most notably involving the left kidney upper pole, most likely representing asymmetric left pyelonephritis. There is a notable 3.3 x 2.2 cm focus of low attenuation within the left kidney upper pole, which could represent a developing cortical abscess. There is a mild amount of left perinephric stranding. The right kidney is normal.  There is no evidence of a renal or ureteral calculus or hydronephrosis. GI/Bowel: Evaluation of the hollow GI tract demonstrates no evidence of abnormal bowel wall thickening, dilatation, or bowel obstruction. The appendix is normal. Pelvis:  The urinary bladder appears mildly thick-walled, though this is likely secondary to partial collapse. However, there is circumferential urothelial enhancement of the urinary bladder, which may suggest underlying cystitis. The uterus and bilateral adnexa are unremarkable. There is a mild amount of free pelvic fluid, likely physiologic. No walled-off fluid collection or abscess is seen. No pathologic pelvic lymphadenopathy is identified. Peritoneum/Retroperitoneum: No intraperitoneal free air or free fluid is identified. No pathologic lymphadenopathy is seen. The abdominal aorta is unremarkable. No abdominal wall hernia is evident. Bones/Soft Tissues: No acute findings. 1. Findings are most consistent with asymmetric left pyelonephritis with a possible developing cortical abscess within the left kidney upper pole. 2. Findings suggestive of cystitis.          Signed:    Biju Mix MD   11/20/2021

## 2021-11-20 NOTE — PROGRESS NOTES
Patient is alert and oriented, patient denied pain this morning, she wants to see the doctor as soon as possible and go home. I told the patient that I would sent him a note and let him know that she was feeling better and is tolerating a regular diet.

## 2021-11-20 NOTE — PROGRESS NOTES
Urology Progress Note  [facility]    Provider: Raquel Perez MD  Patient ID:  Admission Date: 11/15/2021 Name: Neftali Vaughn  OR Date: 11/15/2021 MRN: [id number]   Patient Location: [id number] : 2000  Attending: Citlalli Gross MD Date of Service: 2021  PCP: No primary care provider on file. Diagnoses:  Pyelonephritis, Possible Renal Abscess    Assessment/Plan:  25 yo female presents with initial hx of dysuria and frequency progressing to left flank pain associated with nausea, emesis, chills and fever of 102F. UA is concerning for infection. CT a/p demonstrates left pyelonephritis with possible developing 3.3 x 2.2 cm left upper pole cortical abscess. Leukocytosis is 24.2k today, Cr is stable. Fever curve improving. Low grade. Leukocytosis improved 15K from 16k. Denies flank pain or nausea. Urine culture with pansensitive E. Coli 11-15  Blood cultures negative x2 -15    Recommendations:  Continue antibiotics per culture. Follow fever curve and WBC. Repeat CT shows no renal abscess or drainable fluid collection. Discharge when medically clear, Urology signing off outpatient follow-up requested     The patient had a chance to ask questions which were answered. she understands the above plan. Subjective:   Neftali Vaughn is a 24 y.o. female. She was seen and examined this morning. Today we discussed CT findings.   Pain in flank continues to improve no fevers overnight    Objective:   Vitals:  Vitals:    21 0716   BP: 132/84   Pulse: 83   Resp: 18   Temp: 99.1 °F (37.3 °C)   SpO2: 98%       Intake/Output Summary (Last 24 hours) at 2021 5768  Last data filed at 2021 1735  Gross per 24 hour   Intake 1320 ml   Output --   Net 1320 ml     Physical Exam:  Gen: Alert and oriented x3, no acute distress  CV: Regular rate   Resp: unlabored respirations  Abd: Soft, non-distended, non-tender, no masses  Ext: no peripheral edema noted, moves upper and lower extremities spontaneously  Skin: warmand well perfused, no rashes noted on the face, or arms.    No CVA tenderness    Labs:  Lab Results   Component Value Date    WBC 15.4 (H) 11/20/2021    HGB 11.4 (L) 11/20/2021    HCT 33.5 (L) 11/20/2021    MCV 88.3 11/20/2021     (H) 11/20/2021     Lab Results   Component Value Date    CREATININE 0.7 11/20/2021    BUN 4 (L) 11/20/2021     11/20/2021    K 3.6 11/20/2021     11/20/2021    CO2 25 11/20/2021       Poppy Carney MD   11/20/2021

## 2021-11-21 LAB
BANDED NEUTROPHILS RELATIVE PERCENT: 1 % (ref 0–7)
BASOPHILS ABSOLUTE: 0.2 K/UL (ref 0–0.2)
BASOPHILS RELATIVE PERCENT: 1 %
EOSINOPHILS ABSOLUTE: 0.3 K/UL (ref 0–0.6)
EOSINOPHILS RELATIVE PERCENT: 2 %
HCT VFR BLD CALC: 33.5 % (ref 36–48)
HEMATOLOGY PATH CONSULT: YES
HEMOGLOBIN: 11.4 G/DL (ref 12–16)
LYMPHOCYTES ABSOLUTE: 2.6 K/UL (ref 1–5.1)
LYMPHOCYTES RELATIVE PERCENT: 17 %
MCH RBC QN AUTO: 30 PG (ref 26–34)
MCHC RBC AUTO-ENTMCNC: 33.9 G/DL (ref 31–36)
MCV RBC AUTO: 88.3 FL (ref 80–100)
MONOCYTES ABSOLUTE: 1.8 K/UL (ref 0–1.3)
MONOCYTES RELATIVE PERCENT: 12 %
NEUTROPHILS ABSOLUTE: 10.5 K/UL (ref 1.7–7.7)
NEUTROPHILS RELATIVE PERCENT: 67 %
PDW BLD-RTO: 13.5 % (ref 12.4–15.4)
PLATELET # BLD: 517 K/UL (ref 135–450)
PLATELET SLIDE REVIEW: ABNORMAL
PMV BLD AUTO: 6.7 FL (ref 5–10.5)
RBC # BLD: 3.79 M/UL (ref 4–5.2)
RBC # BLD: NORMAL 10*6/UL
SLIDE REVIEW: ABNORMAL
TOXIC GRANULATION: PRESENT
WBC # BLD: 15.4 K/UL (ref 4–11)

## 2021-11-22 LAB — HEMATOLOGY PATH CONSULT: NORMAL

## 2022-01-26 NOTE — CONSULTS
Urology Consult Note  [facility]     Patient: Dwaine Bonilla MRN: [id number]  Room/Bed: [facility]-0497/8769-14   YOB: 2000  Age/Sex: 24 y. o.female  Admission Date: 11/15/2021     Date of Service:  11/16/2021    Consulting Provider: YAHIR Sherwood - GERALD  Admitting/Requesting Physician: Elizabeth Han MD  Primary Care Physician: No primary care provider on file. Reason for Consult: Pyelonephritis, Possible Renal Abscess    ASSESSMENT/PLAN     23 yo female presents with dysuria and frequency progressing to left flank pain associated with nausea, emesis, chills and fever of 102F. UA is concerning for infection. CT a/p demonstrates left pyelonephritis with possible developing 3.3 x 2.2 cm left upper pole cortical abscess. Leukocytosis is 24.2k today, Cr is stable, T-max 100.9. Recommendations:  Continue antibiotics and follow up on urine culture. Follow fever curve and WBC. Eventually will need to re-image for resolution of pyelonephritis and possible renal abscess. If clinically worsening or not improving, consult IR for drainage. All the patients questions were answered. She understands the plan as listed above. HISTORY     Chief Complaint:   Chief Complaint   Patient presents with    Abdominal Pain     Pt to ER from home for complaint of generalized abdominal pain, low back pain, N/V, and fever starting 2 days ago. Pt reports poor appetite, as well as constipation. She also reports frequency of urine and cloudy urine. +dizziness.  Emesis    Fever    Back Pain       History of Present Illness: Dwaine Bonilla is a 24 y.o. female with left pyelonephritis. Onset of symptoms was several days ago with improving course since that time. Symptoms are aggravated by left renal abscess. Symptoms improved with abx and fluids. Associated symptoms include nausea and vomitting. Patient also reports left 10/10 flank pain associated with nausea, emesis, fever and chills. She has tried the following treatments: abx and fluids. Past Medical History:  She has a past medical history of Depression and Schizoaffective disorder ([address] Past Surgical History:  She has no past surgical history on file. Allergies:  No Known Allergies     Social History:  She reports that she has never smoked. She has never used smokeless tobacco. She reports that she does not drink alcohol and does not use drugs. Family History:  family history is not on file. Medications:  Scheduled Meds:   piperacillin-tazobactam  3,375 mg IntraVENous Q8H    sodium chloride flush  5-40 mL IntraVENous 2 times per day    enoxaparin  40 mg SubCUTAneous Daily    influenza virus vaccine  0.5 mL IntraMUSCular Prior to discharge     Continuous Infusions:   lactated ringers 100 mL/hr at 11/16/21 0615    sodium chloride 25 mL (11/16/21 0610)     PRN Meds:morphine, sodium chloride flush, sodium chloride, ondansetron **OR** ondansetron, polyethylene glycol, acetaminophen **OR** acetaminophen    Review of Systems:  Pertinent positives/negatives reviewed in HPI. All other systems reviewed and negative, unless noted below. Constitutional: Negative  Genitourinary: left flank pain, dysuria, frequency  HEENT: Negative   Cardiovascular: Negative   Respiratory: Negative   Gastrointestinal: Negative   Musculoskeletal: Negative   Neurological: Negative   Psychiatric: Negative   Integumentary: Negative     PHYSICAL EXAM     Vitals:    11/16/21 0715   BP: 98/61   Pulse: 107   Resp: 18   Temp: 98.5 °F (36.9 °C)   SpO2: 98%     Constitutional: NAD, well-developed, well-nourished. HEENT: MMM. Hearing intact. Neck: no thyroid masses appreciated. Trachea is midline. Neck appears unremarkable. Lymph: no palpable adenopathy in supraclavicular, or axillary lymph nodes. Cardiovascular: Regular rate. No peripheral edema. ABDOMEN: Abdomen soft, non-tender, non-distended. No enlarged liver or spleen. No hernias noted.  Stool occult blood not indicated. Respiratory: Respirations are even and non-labored. No audible breath sounds. Genitourinary: LEFT, CVAT, pelvic deferred. Psychiatric: A + O x 3, normal affect. Insight appears intact. Muskuloskeletal: LUIS MIGUEL x 4  Skin: Pink, warm and dry. No rashes on face and arms. Intake/Output Summary (Last 24 hours) at 11/16/2021 0948  Last data filed at 11/16/2021 0615  Gross per 24 hour   Intake 3468.62 ml   Output --   Net 3468.62 ml       LABS     CBC:   Lab Results   Component Value Date    WBC 24.2 11/16/2021    RBC 3.60 11/16/2021    HGB 10.8 11/16/2021    HCT 32.5 11/16/2021    MCV 90.3 11/16/2021    MCH 30.1 11/16/2021    MCHC 33.4 11/16/2021    RDW 13.2 11/16/2021     11/16/2021    MPV 7.0 11/16/2021     BMP:   Lab Results   Component Value Date     11/16/2021    K 3.8 11/16/2021     11/16/2021    CO2 22 11/16/2021    BUN 7 11/16/2021    CREATININE 0.8 11/16/2021    GLUCOSE 121 11/16/2021    CALCIUM 8.0 11/16/2021     Urinalysis:   Lab Results   Component Value Date    COLORU DK YELLOW 11/15/2021    GLUCOSEU Negative 11/15/2021    BLOODU MODERATE 11/15/2021    NITRU POSITIVE 11/15/2021    LEUKOCYTESUR LARGE 11/15/2021     Urine culture: No results for input(s): LABURIN in the last 72 hours. IMAGING     CT ABDOMEN PELVIS W IV CONTRAST Additional Contrast? None    Result Date: 11/15/2021  EXAMINATION: CT OF THE ABDOMEN AND PELVIS WITH CONTRAST, 11/15/2021 12:02 pm TECHNIQUE: CT of the abdomen and pelvis was performed with the administration of intravenous contrast. Multiplanar reformatted images are provided for review. Dose modulation, iterative reconstruction, and/or weight based adjustment of the mA/kV was utilized to reduce the radiation dose to as low as reasonably achievable.  COMPARISON: None HISTORY: ORDERING SYSTEM PROVIDED HISTORY:  Generalized abdominal pain TECHNOLOGIST PROVIDED HISTORY: Reason for Exam:  Generalized abdominal pain Additional Contrast? None Decision Support Exception - unselect if not a suspected or confirmed emergency medical condition->Emergency Medical Condition (MA) Reason for Exam:  Generalized abdominal pain Acuity:  Unknown Type of Exam:  Unknown FINDINGS: Lower Chest: Lung bases are clear. Organs: The liver and spleen are normal.  The pancreas is unremarkable. The gallbladder is normal.  The adrenal glands are normal bilaterally. There are somewhat lobular foci of cortical low-attenuation throughout the left renal cortex, most notably involving the left kidney upper pole, most likely representing asymmetric left pyelonephritis. There is a notable 3.3 x 2.2 cm focus of low attenuation within the left kidney upper pole, which could represent a developing cortical abscess. There is a mild amount of left perinephric stranding. The right kidney is normal.  There is no evidence of a renal or ureteral calculus or hydronephrosis. GI/Bowel: Evaluation of the hollow GI tract demonstrates no evidence of abnormal bowel wall thickening, dilatation, or bowel obstruction. The appendix is normal. Pelvis: The urinary bladder appears mildly thick-walled, though this is likely secondary to partial collapse. However, there is circumferential ____ thin enhancement of the urinary bladder, which may suggest underlying cystitis. The uterus and bilateral adnexa are unremarkable. There is a mild amount of free pelvic fluid, likely physiologic. No walled-off fluid collection or abscess is seen. No pathologic pelvic lymphadenopathy is identified. Peritoneum/Retroperitoneum: No intraperitoneal free air or free fluid is identified. No pathologic lymphadenopathy is seen. The abdominal aorta is unremarkable. No abdominal wall hernia is evident. Bones/Soft Tissues: No acute findings. 1. Findings are most consistent with asymmetric left pyelonephritis with a possible developing cortical abscess within the left kidney upper pole.  2. Findings suggestive of cystitis.             Electronically signed by: YAHIR Villalobos CNP, 11/16/2021   [facility]  Office contact: [phone] 26-Jan-2022 18:48